# Patient Record
Sex: MALE | Employment: FULL TIME | URBAN - METROPOLITAN AREA
[De-identification: names, ages, dates, MRNs, and addresses within clinical notes are randomized per-mention and may not be internally consistent; named-entity substitution may affect disease eponyms.]

---

## 2018-09-13 ENCOUNTER — HOSPITAL ENCOUNTER (INPATIENT)
Facility: CLINIC | Age: 58
LOS: 2 days | Discharge: HOME | End: 2018-09-15
Attending: INTERNAL MEDICINE | Admitting: INTERNAL MEDICINE
Payer: COMMERCIAL

## 2018-09-13 ENCOUNTER — APPOINTMENT (OUTPATIENT)
Dept: ULTRASOUND IMAGING | Facility: CLINIC | Age: 58
End: 2018-09-13
Attending: STUDENT IN AN ORGANIZED HEALTH CARE EDUCATION/TRAINING PROGRAM
Payer: COMMERCIAL

## 2018-09-13 ENCOUNTER — APPOINTMENT (OUTPATIENT)
Dept: GENERAL RADIOLOGY | Facility: CLINIC | Age: 58
End: 2018-09-13
Attending: STUDENT IN AN ORGANIZED HEALTH CARE EDUCATION/TRAINING PROGRAM
Payer: COMMERCIAL

## 2018-09-13 DIAGNOSIS — K26.9 DUODENAL ULCER WITHOUT HEMORRHAGE OR PERFORATION AND WITHOUT OBSTRUCTION: ICD-10-CM

## 2018-09-13 DIAGNOSIS — R91.8 PULMONARY NODULES: ICD-10-CM

## 2018-09-13 DIAGNOSIS — I50.9 ACUTE ON CHRONIC HEART FAILURE, UNSPECIFIED HEART FAILURE TYPE (H): Primary | ICD-10-CM

## 2018-09-13 DIAGNOSIS — I10 BENIGN ESSENTIAL HYPERTENSION: ICD-10-CM

## 2018-09-13 DIAGNOSIS — I25.118 CORONARY ARTERY DISEASE INVOLVING NATIVE CORONARY ARTERY OF NATIVE HEART WITH OTHER FORM OF ANGINA PECTORIS (H): ICD-10-CM

## 2018-09-13 DIAGNOSIS — I21.4 NSTEMI (NON-ST ELEVATED MYOCARDIAL INFARCTION) (H): ICD-10-CM

## 2018-09-13 DIAGNOSIS — I20.0 UNSTABLE ANGINA (H): ICD-10-CM

## 2018-09-13 DIAGNOSIS — B18.2 CHRONIC HEPATITIS C WITHOUT HEPATIC COMA (H): ICD-10-CM

## 2018-09-13 DIAGNOSIS — F10.20 CHRONIC ALCOHOLISM (H): ICD-10-CM

## 2018-09-13 DIAGNOSIS — F17.218 CIGARETTE NICOTINE DEPENDENCE WITH OTHER NICOTINE-INDUCED DISORDER: ICD-10-CM

## 2018-09-13 LAB
ALBUMIN SERPL-MCNC: 3.6 G/DL (ref 3.4–5)
ALBUMIN UR-MCNC: NEGATIVE MG/DL
ALP SERPL-CCNC: 88 U/L (ref 40–150)
ALT SERPL W P-5'-P-CCNC: 40 U/L (ref 0–70)
ANION GAP SERPL CALCULATED.3IONS-SCNC: 12 MMOL/L (ref 3–14)
APPEARANCE UR: CLEAR
AST SERPL W P-5'-P-CCNC: 23 U/L (ref 0–45)
BASOPHILS # BLD AUTO: 0 10E9/L (ref 0–0.2)
BASOPHILS NFR BLD AUTO: 0.4 %
BILIRUB SERPL-MCNC: 0.6 MG/DL (ref 0.2–1.3)
BILIRUB UR QL STRIP: NEGATIVE
BUN SERPL-MCNC: 12 MG/DL (ref 7–30)
CALCIUM SERPL-MCNC: 9 MG/DL (ref 8.5–10.1)
CHLORIDE SERPL-SCNC: 103 MMOL/L (ref 94–109)
CO2 SERPL-SCNC: 24 MMOL/L (ref 20–32)
COLOR UR AUTO: YELLOW
CREAT SERPL-MCNC: 0.83 MG/DL (ref 0.66–1.25)
DIFFERENTIAL METHOD BLD: NORMAL
EOSINOPHIL # BLD AUTO: 0.2 10E9/L (ref 0–0.7)
EOSINOPHIL NFR BLD AUTO: 2 %
ERYTHROCYTE [DISTWIDTH] IN BLOOD BY AUTOMATED COUNT: 14 % (ref 10–15)
GFR SERPL CREATININE-BSD FRML MDRD: >90 ML/MIN/1.7M2
GLUCOSE SERPL-MCNC: 107 MG/DL (ref 70–99)
GLUCOSE UR STRIP-MCNC: NEGATIVE MG/DL
HBA1C MFR BLD: 6.2 % (ref 0–5.6)
HCT VFR BLD AUTO: 42.3 % (ref 40–53)
HGB BLD-MCNC: 13.7 G/DL (ref 13.3–17.7)
HGB UR QL STRIP: NEGATIVE
IMM GRANULOCYTES # BLD: 0 10E9/L (ref 0–0.4)
IMM GRANULOCYTES NFR BLD: 0.1 %
INR PPP: 1.07 (ref 0.86–1.14)
KETONES UR STRIP-MCNC: NEGATIVE MG/DL
LEUKOCYTE ESTERASE UR QL STRIP: NEGATIVE
LMWH PPP CHRO-ACNC: <0.1 IU/ML
LYMPHOCYTES # BLD AUTO: 3 10E9/L (ref 0.8–5.3)
LYMPHOCYTES NFR BLD AUTO: 34.8 %
MAGNESIUM SERPL-MCNC: 2.1 MG/DL (ref 1.6–2.3)
MCH RBC QN AUTO: 29.8 PG (ref 26.5–33)
MCHC RBC AUTO-ENTMCNC: 32.4 G/DL (ref 31.5–36.5)
MCV RBC AUTO: 92 FL (ref 78–100)
MONOCYTES # BLD AUTO: 0.8 10E9/L (ref 0–1.3)
MONOCYTES NFR BLD AUTO: 9.6 %
MUCOUS THREADS #/AREA URNS LPF: PRESENT /LPF
NEUTROPHILS # BLD AUTO: 4.6 10E9/L (ref 1.6–8.3)
NEUTROPHILS NFR BLD AUTO: 53.1 %
NITRATE UR QL: NEGATIVE
NRBC # BLD AUTO: 0 10*3/UL
NRBC BLD AUTO-RTO: 0 /100
PH UR STRIP: 5.5 PH (ref 5–7)
PLATELET # BLD AUTO: 381 10E9/L (ref 150–450)
PLATELET # BLD EST: NORMAL 10*3/UL
POTASSIUM SERPL-SCNC: 3.9 MMOL/L (ref 3.4–5.3)
PROT SERPL-MCNC: 8.4 G/DL (ref 6.8–8.8)
RBC # BLD AUTO: 4.59 10E12/L (ref 4.4–5.9)
RBC #/AREA URNS AUTO: <1 /HPF (ref 0–2)
SODIUM SERPL-SCNC: 139 MMOL/L (ref 133–144)
SOURCE: ABNORMAL
SP GR UR STRIP: 1.04 (ref 1–1.03)
TROPONIN I SERPL-MCNC: <0.015 UG/L (ref 0–0.04)
UROBILINOGEN UR STRIP-MCNC: NORMAL MG/DL (ref 0–2)
WBC # BLD AUTO: 8.6 10E9/L (ref 4–11)
WBC #/AREA URNS AUTO: <1 /HPF (ref 0–5)

## 2018-09-13 PROCEDURE — 25000128 H RX IP 250 OP 636: Performed by: STUDENT IN AN ORGANIZED HEALTH CARE EDUCATION/TRAINING PROGRAM

## 2018-09-13 PROCEDURE — 80053 COMPREHEN METABOLIC PANEL: CPT | Performed by: STUDENT IN AN ORGANIZED HEALTH CARE EDUCATION/TRAINING PROGRAM

## 2018-09-13 PROCEDURE — 93975 VASCULAR STUDY: CPT | Mod: TC

## 2018-09-13 PROCEDURE — 86803 HEPATITIS C AB TEST: CPT | Performed by: STUDENT IN AN ORGANIZED HEALTH CARE EDUCATION/TRAINING PROGRAM

## 2018-09-13 PROCEDURE — 85025 COMPLETE CBC W/AUTO DIFF WBC: CPT | Performed by: STUDENT IN AN ORGANIZED HEALTH CARE EDUCATION/TRAINING PROGRAM

## 2018-09-13 PROCEDURE — 40000141 ZZH STATISTIC PERIPHERAL IV START W/O US GUIDANCE

## 2018-09-13 PROCEDURE — 25000132 ZZH RX MED GY IP 250 OP 250 PS 637: Performed by: STUDENT IN AN ORGANIZED HEALTH CARE EDUCATION/TRAINING PROGRAM

## 2018-09-13 PROCEDURE — 93005 ELECTROCARDIOGRAM TRACING: CPT

## 2018-09-13 PROCEDURE — 80307 DRUG TEST PRSMV CHEM ANLYZR: CPT | Performed by: STUDENT IN AN ORGANIZED HEALTH CARE EDUCATION/TRAINING PROGRAM

## 2018-09-13 PROCEDURE — 87522 HEPATITIS C REVRS TRNSCRPJ: CPT | Performed by: STUDENT IN AN ORGANIZED HEALTH CARE EDUCATION/TRAINING PROGRAM

## 2018-09-13 PROCEDURE — 83735 ASSAY OF MAGNESIUM: CPT | Performed by: STUDENT IN AN ORGANIZED HEALTH CARE EDUCATION/TRAINING PROGRAM

## 2018-09-13 PROCEDURE — 84484 ASSAY OF TROPONIN QUANT: CPT | Performed by: STUDENT IN AN ORGANIZED HEALTH CARE EDUCATION/TRAINING PROGRAM

## 2018-09-13 PROCEDURE — 93010 ELECTROCARDIOGRAM REPORT: CPT | Mod: 76 | Performed by: INTERNAL MEDICINE

## 2018-09-13 PROCEDURE — 71045 X-RAY EXAM CHEST 1 VIEW: CPT

## 2018-09-13 PROCEDURE — 36415 COLL VENOUS BLD VENIPUNCTURE: CPT | Performed by: STUDENT IN AN ORGANIZED HEALTH CARE EDUCATION/TRAINING PROGRAM

## 2018-09-13 PROCEDURE — 87389 HIV-1 AG W/HIV-1&-2 AB AG IA: CPT | Performed by: STUDENT IN AN ORGANIZED HEALTH CARE EDUCATION/TRAINING PROGRAM

## 2018-09-13 PROCEDURE — 83036 HEMOGLOBIN GLYCOSYLATED A1C: CPT | Performed by: STUDENT IN AN ORGANIZED HEALTH CARE EDUCATION/TRAINING PROGRAM

## 2018-09-13 PROCEDURE — 85610 PROTHROMBIN TIME: CPT | Performed by: STUDENT IN AN ORGANIZED HEALTH CARE EDUCATION/TRAINING PROGRAM

## 2018-09-13 PROCEDURE — 85520 HEPARIN ASSAY: CPT | Performed by: STUDENT IN AN ORGANIZED HEALTH CARE EDUCATION/TRAINING PROGRAM

## 2018-09-13 PROCEDURE — 81001 URINALYSIS AUTO W/SCOPE: CPT | Performed by: STUDENT IN AN ORGANIZED HEALTH CARE EDUCATION/TRAINING PROGRAM

## 2018-09-13 PROCEDURE — 21400006 ZZH R&B CCU INTERMEDIATE UMMC

## 2018-09-13 RX ORDER — LIDOCAINE 40 MG/G
CREAM TOPICAL
Status: DISCONTINUED | OUTPATIENT
Start: 2018-09-13 | End: 2018-09-15 | Stop reason: HOSPADM

## 2018-09-13 RX ORDER — METOPROLOL SUCCINATE 25 MG/1
25 TABLET, EXTENDED RELEASE ORAL DAILY
Status: DISCONTINUED | OUTPATIENT
Start: 2018-09-13 | End: 2018-09-15 | Stop reason: HOSPADM

## 2018-09-13 RX ORDER — ASPIRIN 81 MG/1
81 TABLET, CHEWABLE ORAL DAILY
Status: DISCONTINUED | OUTPATIENT
Start: 2018-09-14 | End: 2018-09-14

## 2018-09-13 RX ORDER — MULTIPLE VITAMINS W/ MINERALS TAB 9MG-400MCG
1 TAB ORAL DAILY
Status: DISCONTINUED | OUTPATIENT
Start: 2018-09-13 | End: 2018-09-15 | Stop reason: HOSPADM

## 2018-09-13 RX ORDER — NITROGLYCERIN 0.4 MG/1
0.4 TABLET SUBLINGUAL EVERY 5 MIN PRN
Status: DISCONTINUED | OUTPATIENT
Start: 2018-09-13 | End: 2018-09-15 | Stop reason: HOSPADM

## 2018-09-13 RX ORDER — LANOLIN ALCOHOL/MO/W.PET/CERES
100 CREAM (GRAM) TOPICAL DAILY
Status: DISCONTINUED | OUTPATIENT
Start: 2018-09-13 | End: 2018-09-15 | Stop reason: HOSPADM

## 2018-09-13 RX ORDER — HEPARIN SODIUM 10000 [USP'U]/100ML
0-3500 INJECTION, SOLUTION INTRAVENOUS CONTINUOUS
Status: DISCONTINUED | OUTPATIENT
Start: 2018-09-13 | End: 2018-09-14

## 2018-09-13 RX ORDER — LISINOPRIL 5 MG/1
5 TABLET ORAL DAILY
Status: DISCONTINUED | OUTPATIENT
Start: 2018-09-13 | End: 2018-09-14

## 2018-09-13 RX ORDER — POLYETHYLENE GLYCOL 3350 17 G/17G
17 POWDER, FOR SOLUTION ORAL DAILY PRN
Status: DISCONTINUED | OUTPATIENT
Start: 2018-09-13 | End: 2018-09-15 | Stop reason: HOSPADM

## 2018-09-13 RX ORDER — NICOTINE 21 MG/24HR
1 PATCH, TRANSDERMAL 24 HOURS TRANSDERMAL DAILY
Status: DISCONTINUED | OUTPATIENT
Start: 2018-09-13 | End: 2018-09-15 | Stop reason: HOSPADM

## 2018-09-13 RX ORDER — NITROGLYCERIN 20 MG/100ML
0.07-2 INJECTION INTRAVENOUS CONTINUOUS
Status: DISCONTINUED | OUTPATIENT
Start: 2018-09-13 | End: 2018-09-13

## 2018-09-13 RX ORDER — ACETAMINOPHEN 650 MG/1
650 SUPPOSITORY RECTAL EVERY 4 HOURS PRN
Status: DISCONTINUED | OUTPATIENT
Start: 2018-09-13 | End: 2018-09-15 | Stop reason: HOSPADM

## 2018-09-13 RX ORDER — ACETAMINOPHEN 325 MG/1
650 TABLET ORAL EVERY 4 HOURS PRN
Status: DISCONTINUED | OUTPATIENT
Start: 2018-09-13 | End: 2018-09-15 | Stop reason: HOSPADM

## 2018-09-13 RX ORDER — FUROSEMIDE 10 MG/ML
40 INJECTION INTRAMUSCULAR; INTRAVENOUS ONCE
Status: COMPLETED | OUTPATIENT
Start: 2018-09-13 | End: 2018-09-13

## 2018-09-13 RX ORDER — NITROGLYCERIN 20 MG/100ML
0.07-2 INJECTION INTRAVENOUS CONTINUOUS
Status: DISCONTINUED | OUTPATIENT
Start: 2018-09-13 | End: 2018-09-14

## 2018-09-13 RX ORDER — BISACODYL 5 MG
15 TABLET, DELAYED RELEASE (ENTERIC COATED) ORAL DAILY PRN
Status: DISCONTINUED | OUTPATIENT
Start: 2018-09-13 | End: 2018-09-15 | Stop reason: HOSPADM

## 2018-09-13 RX ORDER — PANTOPRAZOLE SODIUM 40 MG/1
40 TABLET, DELAYED RELEASE ORAL
Status: DISCONTINUED | OUTPATIENT
Start: 2018-09-13 | End: 2018-09-15 | Stop reason: HOSPADM

## 2018-09-13 RX ORDER — ALUMINA, MAGNESIA, AND SIMETHICONE 2400; 2400; 240 MG/30ML; MG/30ML; MG/30ML
30 SUSPENSION ORAL EVERY 4 HOURS PRN
Status: DISCONTINUED | OUTPATIENT
Start: 2018-09-13 | End: 2018-09-15 | Stop reason: HOSPADM

## 2018-09-13 RX ORDER — LORAZEPAM 1 MG/1
1-2 TABLET ORAL EVERY 30 MIN PRN
Status: DISCONTINUED | OUTPATIENT
Start: 2018-09-13 | End: 2018-09-15 | Stop reason: HOSPADM

## 2018-09-13 RX ORDER — DOCUSATE SODIUM 100 MG/1
100 CAPSULE, LIQUID FILLED ORAL 2 TIMES DAILY
Status: DISCONTINUED | OUTPATIENT
Start: 2018-09-13 | End: 2018-09-15 | Stop reason: HOSPADM

## 2018-09-13 RX ORDER — FOLIC ACID 1 MG/1
1 TABLET ORAL DAILY
Status: DISCONTINUED | OUTPATIENT
Start: 2018-09-13 | End: 2018-09-15 | Stop reason: HOSPADM

## 2018-09-13 RX ORDER — ATORVASTATIN CALCIUM 80 MG/1
80 TABLET, FILM COATED ORAL EVERY EVENING
Status: DISCONTINUED | OUTPATIENT
Start: 2018-09-13 | End: 2018-09-15 | Stop reason: HOSPADM

## 2018-09-13 RX ORDER — BISACODYL 5 MG
10 TABLET, DELAYED RELEASE (ENTERIC COATED) ORAL DAILY PRN
Status: DISCONTINUED | OUTPATIENT
Start: 2018-09-13 | End: 2018-09-15 | Stop reason: HOSPADM

## 2018-09-13 RX ORDER — BISACODYL 5 MG
5 TABLET, DELAYED RELEASE (ENTERIC COATED) ORAL DAILY PRN
Status: DISCONTINUED | OUTPATIENT
Start: 2018-09-13 | End: 2018-09-15 | Stop reason: HOSPADM

## 2018-09-13 RX ADMIN — MULTIPLE VITAMINS W/ MINERALS TAB 1 TABLET: TAB at 18:33

## 2018-09-13 RX ADMIN — Medication 100 MG: at 18:33

## 2018-09-13 RX ADMIN — LISINOPRIL 5 MG: 5 TABLET ORAL at 18:42

## 2018-09-13 RX ADMIN — NITROGLYCERIN 0.07 MCG/KG/MIN: 20 INJECTION INTRAVENOUS at 21:11

## 2018-09-13 RX ADMIN — NITROGLYCERIN 0.4 MG: 0.4 TABLET, ORALLY DISINTEGRATING SUBLINGUAL at 20:08

## 2018-09-13 RX ADMIN — FOLIC ACID 1 MG: 1 TABLET ORAL at 18:33

## 2018-09-13 RX ADMIN — HEPARIN SODIUM 850 UNITS/HR: 10000 INJECTION, SOLUTION INTRAVENOUS at 21:32

## 2018-09-13 RX ADMIN — FUROSEMIDE 40 MG: 10 INJECTION, SOLUTION INTRAVENOUS at 18:33

## 2018-09-13 RX ADMIN — NITROGLYCERIN 0.4 MG: 0.4 TABLET, ORALLY DISINTEGRATING SUBLINGUAL at 20:13

## 2018-09-13 RX ADMIN — NITROGLYCERIN 0.4 MG: 0.4 TABLET, ORALLY DISINTEGRATING SUBLINGUAL at 20:03

## 2018-09-13 RX ADMIN — METOPROLOL SUCCINATE 25 MG: 25 TABLET, EXTENDED RELEASE ORAL at 18:33

## 2018-09-13 RX ADMIN — PANTOPRAZOLE SODIUM 40 MG: 40 TABLET, DELAYED RELEASE ORAL at 18:33

## 2018-09-13 ASSESSMENT — ACTIVITIES OF DAILY LIVING (ADL): ADLS_ACUITY_SCORE: 9

## 2018-09-13 NOTE — IP AVS SNAPSHOT
[address] [address]    Phone:  [phone]                                       After Visit Summary   9/13/2018    bAel Orellana    MRN: [id number]           After Visit Summary Signature Page     I have received my discharge instructions, and my questions have been answered. I have discussed any challenges I see with this plan with the nurse or doctor.    ..........................................................................................................................................  Patient/Patient Representative Signature      ..........................................................................................................................................  Patient Representative Print Name and Relationship to Patient    ..................................................               ................................................  Date                                   Time    ..........................................................................................................................................  Reviewed by Signature/Title    ...................................................              ..............................................  Date                                               Time          22EPIC Rev 08/18

## 2018-09-13 NOTE — IP AVS SNAPSHOT
MRN:[id number]                      After Visit Summary   9/13/2018    Abel Orellana    MRN: [id number]           Thank you!     Thank you for choosing [facility] for your care. Our goal is always to provide you with excellent care. Hearing back from our patients is one way we can continue to improve our services. Please take a few minutes to complete the written survey that you may receive in the mail after you visit with us. Thank you!        Patient Information     Date Of Birth          1960        Designated Caregiver       Most Recent Value    Caregiver    Will someone help with your care after discharge? yes    Name of designated caregiver Brandy Trammell    Phone number of caregiver [phone]    Caregiver address [facility]      About your hospital stay     You were admitted on:  September 13, 2018 You last received care in the:  [address] [facility]    You were discharged on:  September 15, 2018        Reason for your hospital stay       You were admitted due to worsening symptoms of heart failure.                  Who to Call     For medical emergencies, please call 911.  For non-urgent questions about your medical care, please call your primary care provider or clinic, [phone]          Attending Provider     Provider Specialty    David Burns MD Cardiology    BeaufortJerome MD Cardiology       Primary Care Provider Office Phone # Fax #    Luis Ardeleanu [phone]      After Care Instructions     Activity       Your activity upon discharge: activity as tolerated            Diet       Follow this diet upon discharge: Orders Placed This Encounter      Low Saturated Fat Na <2400 mg            Discharge Instructions       STOP use of cigarettes, alcohol and ilegal drugs in order to protect your heart.                  Follow-up Appointments     Adult [facility] Follow-up and recommended labs and tests       Follow up with primary care  "provider, within 7 days to evaluate medication change, to evaluate treatment change and for hospital follow- up.  The following labs/tests are recommended: BMP +Mg.      Appointments on [place] and/or [facility] (with [facility], [place] or [facility] provider or service). Call [phone] or  [phone]  if you haven't heard regarding these appointments within 7 days of discharge.                  Additional Services     CARDIOVASCULAR CORE CLINIC REFERRAL           Cardiac Rehab Referral       *This therapy referral will be filtered to a centralized scheduling office at [facility] and the patient will receive a call to schedule an appointment at a [facility] location most convenient for them.*     If you have not heard from the scheduling office within 2 business days, please call [phone] for all locations, with the exception of [place], please call [phone].    Please be aware that coverage of these services is subject to the terms and limitations of your health insurance plan.  Call member services at your health plan with any benefit or coverage questions.      **Note to Provider:  If you are referring outside of [facility] for the therapy appointment, please list the name of the location in the \"special instructions\" above, print the referral and give to the patient to schedule the appointment.                   Cardiology Eval Adult Referral       Preferred location:  [facility] [phone]   [email or web address]    Please be aware that coverage of these services is subject to the terms and limitations of your health insurance plan.  Call member services at your health plan with any benefit or coverage questions.      Please bring the following to your appointment:  Any x-rays, CTs or MRIs which have been performed. Contact the facility where they were done to arrange for  prior to your scheduled " "appointment.    List of current medications  This referral request   Any documents/labs given to you for this referral            GI Hepatology Adult IP Consult       Pt with liver disease in the setting of alcoholism and active HCV.            Lung Nodule Program Referral Location: [facility] - [phone]       Please be aware that coverage of these services is subject to the terms and limitations of your health insurance plan. Call member services at your health plan with any benefit or coverage questions.     For [facility] and [place] locations, you will be contacted within 1-2 business days to schedule your appointment, if you haven't heard from us please call the number above. For Other referrals, please call the location directly.                       Pending Results     Date and Time Order Name Status Description    9/13/2018 1755 Hepatitis C RNA quantitative In process     9/13/2018 1716 Comprehen Drug Analysis UR In process             Statement of Approval     Ordered          09/15/18 1400  I have reviewed and agree with all the recommendations and orders detailed in this document.  EFFECTIVE NOW     Approved and electronically signed by:  Jean-Claude Florence MD             Admission Information     Date & Time Provider Department Dept. Phone    9/13/2018 Jerome Daily MD [address] [facility] [phone]      Your Vitals Were     Blood Pressure Pulse Temperature Respirations Height Weight    100/72 (BP Location: Right arm) 83 98  F (36.7  C) (Oral) 16 1.676 m (5' 6\") 67.2 kg (148 lb 1.6 oz)    Pulse Oximetry BMI (Body Mass Index)                94% 23.9 kg/m2          MyChart Information     [employer] lets you send messages to your doctor, view your test results, renew your prescriptions, schedule appointments and more. To sign up, go to [email or web address] . Click on \"Log in\" on the left side of the screen, which will take you to the Welcome page. " "Then click on \"Sign up Now\" on the right side of the page.     You will be asked to enter the access code listed below, as well as some personal information. Please follow the directions to create your username and password.     Your access code is: [email or web address]  Expires: 2018  1:07 PM     Your access code will  in 90 days. If you need help or a new code, please call your [facility] clinic or [phone].        Care EveryWhere ID     This is your Care EveryWhere ID. This could be used by other organizations to access your [facility] medical records  [id number]        Equal Access to Services     [facility]: Marcus Cassidy, albina doty, mimi moore, josias laureano . So [facility] [phone].    ATENCIÓN: Si habla español, tiene a marie disposición servicios gratuitos de asistencia lingüística. Llame al [phone].    We comply with applicable federal civil rights laws and [place] laws. We do not discriminate on the basis of race, color, national origin, age, disability, sex, sexual orientation, or gender identity.               Review of your medicines      START taking        Dose / Directions    aspirin 81 MG EC tablet   Used for:  NSTEMI (non-ST elevated myocardial infarction) (H), Coronary artery disease involving native coronary artery of native heart with other form of angina pectoris (H)        Dose:  81 mg   Start taking on:  2018   Take 1 tablet (81 mg) by mouth daily   Quantity:  30 tablet   Refills:  3       atorvastatin 80 MG tablet   Commonly known as:  LIPITOR   Used for:  NSTEMI (non-ST elevated myocardial infarction) (H), Coronary artery disease involving native coronary artery of native heart with other form of angina pectoris (H)        Dose:  80 mg   Take 1 tablet (80 mg) by mouth every evening   Quantity:  30 tablet   Refills:  3       clopidogrel 75 MG tablet   Commonly known as:  PLAVIX   Used for:  NSTEMI (non-ST " elevated myocardial infarction) (H)        Dose:  75 mg   Start taking on:  9/16/2018   Take 1 tablet (75 mg) by mouth daily   Quantity:  30 tablet   Refills:  3       folic acid 1 MG tablet   Commonly known as:  FOLVITE   Used for:  Chronic alcoholism (H)        Dose:  1 mg   Start taking on:  9/16/2018   Take 1 tablet (1 mg) by mouth daily   Quantity:  30 tablet   Refills:  3       furosemide 20 MG tablet   Commonly known as:  LASIX        Dose:  20 mg   Take 1 tablet (20 mg) by mouth daily   Quantity:  30 tablet   Refills:  3       lisinopril 5 MG tablet   Commonly known as:  PRINIVIL/ZESTRIL   Used for:  NSTEMI (non-ST elevated myocardial infarction) (H), Benign essential hypertension        Dose:  5 mg   Start taking on:  9/16/2018   Take 1 tablet (5 mg) by mouth daily   Quantity:  30 tablet   Refills:  3       metoprolol succinate 25 MG 24 hr tablet   Commonly known as:  TOPROL-XL   Used for:  NSTEMI (non-ST elevated myocardial infarction) (H), Coronary artery disease involving native coronary artery of native heart with other form of angina pectoris (H)        Dose:  25 mg   Start taking on:  9/16/2018   Take 1 tablet (25 mg) by mouth daily   Quantity:  30 tablet   Refills:  3       multivitamin, therapeutic with minerals Tabs tablet   Used for:  Chronic alcoholism (H)        Dose:  1 tablet   Start taking on:  9/16/2018   Take 1 tablet by mouth daily   Quantity:  30 each   Refills:  3       nicotine 21 MG/24HR 24 hr patch   Commonly known as:  NICODERM CQ   Used for:  Cigarette nicotine dependence with other nicotine-induced disorder        Dose:  1 patch   Start taking on:  9/16/2018   Place 1 patch onto the skin daily   Quantity:  30 patch   Refills:  3       nitroGLYcerin 0.4 MG sublingual tablet   Commonly known as:  NITROSTAT   Used for:  Unstable angina (H)        For chest pain place 1 tablet under the tongue every 5 minutes for 3 doses. If symptoms persist 5 minutes after 1st dose call 911.    Quantity:  25 tablet   Refills:  3       pantoprazole 40 MG EC tablet   Commonly known as:  PROTONIX   Used for:  Duodenal ulcer without hemorrhage or perforation and without obstruction        Dose:  40 mg   Take 1 tablet (40 mg) by mouth 2 times daily (before meals)   Quantity:  30 tablet   Refills:  3       thiamine 100 MG tablet   Used for:  Chronic alcoholism (H)        Dose:  100 mg   Start taking on:  9/16/2018   Take 1 tablet (100 mg) by mouth daily   Quantity:  30 tablet   Refills:  3            Where to get your medicines      These medications were sent to [facility] - [place], [place] - [address]  [address] [address]     Phone:  [phone]     aspirin 81 MG EC tablet    atorvastatin 80 MG tablet    clopidogrel 75 MG tablet    folic acid 1 MG tablet    furosemide 20 MG tablet    lisinopril 5 MG tablet    metoprolol succinate 25 MG 24 hr tablet    multivitamin, therapeutic with minerals Tabs tablet    nicotine 21 MG/24HR 24 hr patch    nitroGLYcerin 0.4 MG sublingual tablet    pantoprazole 40 MG EC tablet    thiamine 100 MG tablet                Protect others around you: Learn how to safely use, store and throw away your medicines at www.disposemymeds.org.             Medication List: This is a list of all your medications and when to take them. Check marks below indicate your daily home schedule. Keep this list as a reference.      Medications           Morning Afternoon Evening Bedtime As Needed    aspirin 81 MG EC tablet   Take 1 tablet (81 mg) by mouth daily   Start taking on:  9/16/2018   Last time this was given:  81 mg on 9/15/2018  8:04 AM                                   atorvastatin 80 MG tablet   Commonly known as:  LIPITOR   Take 1 tablet (80 mg) by mouth every evening   Last time this was given:  80 mg on 9/14/2018  8:10 PM                                   clopidogrel 75 MG tablet   Commonly known as:  PLAVIX   Take 1 tablet (75 mg) by  mouth daily   Start taking on:  9/16/2018   Last time this was given:  225 mg on 9/15/2018 12:20 PM            Start tomorrow morning 9/16/18                       folic acid 1 MG tablet   Commonly known as:  FOLVITE   Take 1 tablet (1 mg) by mouth daily   Start taking on:  9/16/2018   Last time this was given:  1 mg on 9/15/2018  8:05 AM                                furosemide 20 MG tablet   Commonly known as:  LASIX   Take 1 tablet (20 mg) by mouth daily                                   lisinopril 5 MG tablet   Commonly known as:  PRINIVIL/ZESTRIL   Take 1 tablet (5 mg) by mouth daily   Start taking on:  9/16/2018   Last time this was given:  5 mg on 9/15/2018  8:04 AM                                   metoprolol succinate 25 MG 24 hr tablet   Commonly known as:  TOPROL-XL   Take 1 tablet (25 mg) by mouth daily   Start taking on:  9/16/2018   Last time this was given:  25 mg on 9/15/2018  8:04 AM                                   multivitamin, therapeutic with minerals Tabs tablet   Take 1 tablet by mouth daily   Start taking on:  9/16/2018   Last time this was given:  1 tablet on 9/15/2018  8:03 AM                                   nicotine 21 MG/24HR 24 hr patch   Commonly known as:  NICODERM CQ   Place 1 patch onto the skin daily   Start taking on:  9/16/2018   Last time this was given:  1 patch on 9/15/2018  7:58 AM                                   nitroGLYcerin 0.4 MG sublingual tablet   Commonly known as:  NITROSTAT   For chest pain place 1 tablet under the tongue every 5 minutes for 3 doses. If symptoms persist 5 minutes after 1st dose call 911.   Last time this was given:  0.4 mg on 9/13/2018  8:13 PM                                   pantoprazole 40 MG EC tablet   Commonly known as:  PROTONIX   Take 1 tablet (40 mg) by mouth 2 times daily (before meals)   Last time this was given:  40 mg on 9/15/2018  8:04 AM                                      thiamine 100 MG tablet   Take 1 tablet (100 mg) by mouth  daily   Start taking on:  9/16/2018   Last time this was given:  100 mg on 9/15/2018  8:05 AM                                             More Information        Discharge Instructions for Heart Attack  You have had a heart attack (acute myocardial infarction). A heart attack occurs when a vessel that sends blood to your heart suddenly becomes blocked. This causes your heart not to work as well as it should. Follow these guidelines for home care and lifestyle changes.  Home care    Take your medicines exactly as directed. Don?t skip doses. Talk with your healthcare provider if your medicines aren't working for you. Together you can come up with another treatment plan.    Remember that recovery after a heart attack takes time. Plan to rest for at least 4 to 8 weeks while you recover. Then return to normal activity when your doctor says it?s OK.    Ask your doctor about joining a heart rehabilitation program. This can help strengthen your heart and lungs and give you more energy and confidence.    Tell your doctor if you are feeling depressed. Feelings of sadness are common after a heart attack. But it is important to speak to someone or seek counseling if you are feeling overwhelmed by these feelings.    Call 911 right away if you have chest pain or pain that goes to your shoulder, neck, or back. Don't drive yourself to the hospital.    Ask your family members to learn CPR. This is an important skill that can save lives when it's needed.    Learn to take your own blood pressure and pulse. Keep a record of your results. Ask your doctor when you should seek emergency medical attention. He or she will tell you which blood pressure reading is dangerous.  Lifestyle changes  Your heart attack might have been caused by cardiovascular disease. Your healthcare provider will work with you to make changes to your lifestyle. This will help the heart disease from getting worse. These changes will most likely be a combination of  diet and exercise.  Diet  Your healthcare provider will tell you what changes you need to make to your diet. You may need to see a registered dietitian for help with these diet changes. These changes may include:    Cutting back on how much fat and cholesterol you eat    Cutting back on how much salt (sodium) you eat, especially if you have high blood pressure    Eating more fresh vegetables and fruits    Eating lean proteins such as fish, poultry, beans, and peas, and eating less red meat and processed meats    Using low-fat dairy products    Using vegetable and nut oils in limited amounts    Limiting how many sweets and processed foods such as chips, cookies, and baked goods you eat    Limiting how often you eat out. And when you do eat out, making better food choices.    Not eating fried or greasy foods, or foods high in saturated fat  Exercise  Your healthcare provider may tell you to get more exercise if you haven't been physically active. Depending on your case, your provider may recommend that you get moderate to vigorous physical activity for at least 40 minutes each day, and for at least 3 to 4 days each week. A few examples of moderate to vigorous activity include:    Walking at a brisk pace, about 3 to 4 miles per hour    Jogging or running    Swimming or water aerobics    Hiking    Dancing    Martial arts    Tennis    Riding a bicycle or stationary bike  Other changes  Your healthcare provider may also recommend that you:    Lose weight. If you are overweight or obese, your provider will work with you to lose extra pounds. Making diet changes and getting more exercise can help. A good goal is to lose your 10% of your body weight in one year.    Stop smoking. Sign up for a stop-smoking program to make it more likely for you to quit for good. You can join a stop-smoking support group. Or ask your doctor about nicotine replacement products.    Learn to manage stress. Stress management techniques to help you  deal with stress in your home and work life. This will help you feel better emotionally and ease the strain on your heart.  Follow-up  Make a follow-up appointment as directed.     Call 911  Call 911 right away if you have:    Chest pain that goes to your neck, jaw, back, or shoulder    Shortness of breath  When to call your healthcare provider  Call your healthcare provider right away if you have:    Lightheadedness, dizziness, or fainting    Feeling of irregular heartbeat or fast pulse   Date Last Reviewed: 10/1/2016    3220-3377 [employer]. [address] [place] [address]. All rights reserved. This information is not intended as a substitute for professional medical care. Always follow your healthcare professional's instructions.                Left-Sided Congestive Heart Failure (CHF)    The heart is a large muscle that pumps blood throughout the body. Blood carries oxygen to all the organs (including the brain), muscles, and skin of your body. After the body takes the oxygen out of the blood, the blood returns to the heart. The right side of the heart collects that blood and pumps it to the lungs to receive fresh oxygen. This oxygen-rich blood from the lungs then returns to the left side of the heart, where it is pumped back out to the brain and rest of the body, starting the process all over.   Heart failure occurs when the heart muscle is weakened. This can occur after a heart attack or with significant coronary artery disease. This affects the pumping action of the heart.   When the left side of the heart is weakened, it can?t handle the blood it is getting from the lungs. Pressure then builds up in the veins of the lungs, causing fluid to leak into the lung tissues. This may be referred to as congestive heart failure. This causes you to feel short of breath, weak, or dizzy. These symptoms are often worse with exertion, such as climbing stairs or walking up hills. Lying flat is  uncomfortable and can make your breathing worse. This may make sleeping difficult and force you to use extra pillows to elevate your upper body to help you sleep well.  Causes of heart failure include:    Coronary artery disease    Heart attack in the past (also known as acute myocardial infarction, or AMI)    High blood pressure    Damaged heart valve    Diabetes    Obesity    Cigarette smoking    Alcohol abuse  Treatment  Heart failure is a chronic condition. There is no cure. The purpose of medical treatment is to improve the pumping action of the heart, and remove excess water and fluids from the body. A number of medicines can help reach this goal, improve symptoms and keep the heart from becoming weaker. In some cases of severe heart failure, a mechanical device will be placed in the heart to help the heart pump. Another major goal is to better treat the causes of heart failure, such as diabetes, high blood pressure, and your lifestyle.  Home care    Check your weight every day. A sudden increase in weight gain could mean worsening heart failure.  ? Use the same scale every day.  ? Weigh yourself at the same time every day.  ? Make sure the scale is on the floor, not on a rug.  ? Keep a record of your weight every day, so your healthcare provider can see it. If you are not given a log sheet for this, keep a separate journal for this purpose.     Cut back on how much salt (sodium) you eat:  ? Your provider will tell you how much salt to have daily, usually 2,000 mg or less.  ? Avoid high-salt foods. These include olives, pickles, smoked meats, processed foods, and salted potato chips.  ? Don't add salt to your food at the table. Use only small amounts of salt when cooking.  ? Avoid binging on salt-heavy meals.    Follow your healthcare provider's recommendations about how much fluid you should have.    Stop smoking.    Cut back on the amount of alcohol you drink.    Lose weight if you are overweight. The excess  weight adds a lot of stress on the workload of the heart.    Stay active. Talk to your provider about an exercise program that is safe for your heart.    Keep your feet elevated to reduce swelling. Ask your provider about support hose as a preventive treatment for daytime leg swelling.    Follow your healthcare provider's instructions closely.  Besides taking your medicine as instructed, an important part of treatment includes lifestyle changes. These include diet, physical activity, stopping smoking, and weight control.  Improve your diet. Often in the hospital, people are given a heart healthy diet. This includes more fresh foods, lower saturated fat, less processed foods, and lower salt.  Follow-up care  Follow up with your healthcare provider, or as advised. Make sure to keep any appointments that were made for you. This can help better control heart failure.  If an X-ray was done, you will be told of any new findings that may affect your care.  Call 911  Call 911 if you:    Become severely short of breath    Feel lightheaded, or feel like you might pass out or faint    Have chest pain or discomfort that is different than usual, the medicines your provider told you to use for this don't help, or the pain lasts longer than 10 to 15 minutes    Develop a rapid heart rate suddenly  When to seek medical advice  Call your healthcare provider right away if you have any of these signs of worsening heart failure:    Sudden weight gain. This means more than 2 pounds in 1 day, or 5 pounds in 1 week, or whatever weight gain you were told to report by your provider    Trouble breathing not related to being active    New or increased swelling of your legs or ankles    Swelling or pain in your abdomen    Breathing trouble at night, waking up short of breath or needing more pillows to elevate your upper body to help you breathe    Frequent coughing that doesn?t go away    Feeling much more tired than usual  Date Last Reviewed:  1/4/2016 2000-2017 [employer]. [address] [place] [address], [place] [address]. All rights reserved. This information is not intended as a substitute for professional medical care. Always follow your healthcare professional's instructions.                Discharge Instructions for Heart Failure  The heart is a muscle that pumps oxygen-rich blood to all parts of the body. When you have heart failure, the heart is not able to pump as well as it should. Blood and fluid may back up into the lungs (congestive heart failure), and some parts of the body don?t get enough oxygen-rich blood to work normally. These problems lead to the symptoms of heart failure. Heart failure can occur due to an injury to the heart or from natural processes.  You can control symptoms of heart failure with some lifestyle changes and by following your doctor's advice.  Activity  Ask your healthcare provider about an exercise program. You can benefit from simple activities such as walking or gardening. Exercising most days of the week can make you feel better. Don't be discouraged if your progress is slow at first. Rest as needed. Stop activity if you develop symptoms such as chest pain, lightheadedness, or significant shortness of breath. Find activities that you enjoy, such as brisk walking, dancing, swimming, or gardening. These will help you stay active and strengthen your heart.  Diet  Follow a heart healthy diet. And make sure to limit the salt (sodium) in your diet. Salt causes your body to hold water. This makes your heart work harder as there is more fluid for the heart to pump. Limit your salt by doing the following:    Limit canned, dried, packaged, and fast foods.    Don't add salt to your food.    Season foods with herbs instead of salt.    Watch how much liquids you drink. Drinking too much can make heart failure worse. Talk with your health care provider about how much you should drink each day.    Limit the amount of  alcohol you drink. It may harm your heart. Women should have no more than 1 drink a day and men should have no more than 2.    When you eat out, request that your meals have no added salt.  Tobacco  If you smoke, it's very important to quit. Smoking increases your chances of having a heart attack by harming the blood vessels that provide oxygen to your heart. This makes heart failure worse. Quitting smoking is the number one thing you can do to improve your health. Enroll in a stop-smoking program to improve your chances of success. Talk with your healthcare provider about medicines or nicotine replacement therapy to help you quit smoking. Ask your healthcare provider about smoking cessation support groups.  Medicine  Take your medicines exactly as prescribed. Learn the names and purpose of each of your medicines. Keep an accurate medicine list and current dosages with you at all times. Don't skip doses. If you miss a dose of your medicine, take it as soon as you remember. If you miss a dose and it's almost time for your next dose, just wait and take your next dose at the normal time. Don't take a double dose. If you are unsure, call your doctor's office. Make sure not to mix up your medicines or forget what you've taken the same day.  Weight monitoring  Weigh yourself every day. A sudden weight gain can mean your heart failure is getting worse. Weigh yourself at the same time of day and in the same kind of clothes. Ideally, weigh yourself first thing in the morning after you empty your bladder, but before you eat breakfast. Your healthcare provider will show you how to track your weight. He or she will also discuss with you when you should call if you have a sudden, unexpected increase in your weight.  In general, your healthcare provider may ask you to report if your weight goes up by more than 2 pounds in 1 day,  5 pounds in 1 week, or whatever weight gain you were told by your doctor. This is a sign that you are  retaining more fluid than you should be. Clues to weight gain include checking your ankles for swelling, or noticing you are short of breath when you lie down.  Follow-up care  Make a follow-up appointment as directed. Depending on the type and severity of heart failure you have, you may need follow-up as early as 7 days from hospital discharge. Keep appointments for checkups and lab tests that are needed to check your medicines and condition.  Recognize that your health and even survival depend on your following medical recommendations.  Symptoms  Heart failure can cause a variety of symptoms, including:    Shortness of breath    Trouble breathing at night, especially when you lie down    Swelling in the legs and feet or in the belly (abdomen)    Becoming easily fatigued    Irregular or rapid heartbeat    Weakness or lightheadedness    Swelling of the neck veins  It is important to know what to do if symptoms get worse or if you develop signs of worsening heart failure.     When to call your healthcare provider  Call your healthcare provider right away if you have any of these signs of worsening heart failure:    Sudden weight gain (more than 2 pounds in 1 day or 5 pounds in 1 week, or whatever weight gain you were told to report by your doctor)    Trouble breathing not related to being active    New or increased swelling of your legs or ankles    Swelling or pain in your abdomen    Breathing trouble at night (waking up short of breath, needing more pillows to breathe)    Frequent coughing that doesn't go away    Feeling much more tired than usual  Call 911  Call 911 right away if you have:    Severe shortness of breath, such that you can't catch your breath even while resting    Severe chest pain that does not resolve with rest or nitroglycerin    Pink, foamy mucus with cough and shortness of breath    A continuous rapid or irregular heartbeat    Passing out or fainting    Stroke symptoms such as sudden numbness  or weakness on one side of your face, arm, or leg or sudden confusion, trouble speaking or vision changes   Date Last Reviewed: 3/21/2016    8463-2680 [employer] [address] [place] [address], [place] [address]. All rights reserved. This information is not intended as a substitute for professional medical care. Always follow your healthcare professional's instructions.

## 2018-09-13 NOTE — H&P
Cardiology History and Physical  Abel Orellana MRN: [id number]  Age: 57 year old, : 1960  Primary care provider: No primary care provider on file.           Chief Complaint:     Shortness of breath and chest pain          History of Present Illness:     58 y/o male with PMHx significant for non-obstructive CAD, NICM LVEF 30% in 2014 (NYHA IIIa Stage C), HTN, HLD, past substance abuse with meth and heroin, chronic alcoholism, chronic HCV and duodenal ulcers was admitted due to worsening shortness of breath on exertion and chest pain.     Patient states that since two weeks ago he suddenly had dyspnea on exertion. Formerly was able to run on the treadmill for 30mins or more without any symptoms. Now he is unable to walk more than a block or go up a flight of stairs without dyspnea. He feels lightheaded with minimal activity, but denies any syncope. At times, he has blurry vision when being very short of breath. One week ago, he felt chest pain on the left side that radiated to the back which occurred on activity. It is described as a pressure-like sensation. Throughout the week chest pain now occurs at rest. It can last more than 30 minutes. He has not taken medications for this and unable to  Identify what makes the chest pain better. Occasionally feels palpitations. He also has PND and orthopnea.     He otherwise denies fever, chills nights sweats, headaches, sore throat, rhinorrhea, abdominal pain, nausea, vomiting, diarrhea, constipation, hematochezia, melena, dysuria, hematuria, joint pain, joint swelling or new rash.    Patient denies recent drug use. He does drink 5 shots of vodka, whiskey or rum per week. Denies hx of STD. Patient smokes about 1/2 - 1 pack of cigs per day. Denies family history of heart disease.    He went to ED at [facility] and had normal vital signs. Labs there were significant for elevated D-dimer. He had CT PE that was negative, but did show cardiomegaly with diffuse  pulmonary edema. Patient was therefore transferred to our Cardiology service for further work up.           Past Medical History:     CHF (congestive heart failure) (H)       Coronary artery disease       Kidney stone       Hypertension       Hepatitis C       Drug abuse                 Past Surgical History:      REP RUP MUSCULOTENDINUS CUFF OPN;AC     right shoulder   EXC LES TEND TEND SHEATH/CAP; FOOT 3/12/07   rt               Social History:     Current alcohol use, 5 drinks per week  Past Meth and Heroin use  Smokes 1/2 - 1 pack of cigs per day          Family History:     No cardiac family history          Allergies:     All allergies reviewed and addressed           Medications:     Current Facility-Administered Medications   Medication     acetaminophen (TYLENOL) Suppository 650 mg     acetaminophen (TYLENOL) tablet 650 mg     alum & mag hydroxide-simethicone (MYLANTA ES/MAALOX  ES) suspension 30 mL     [START ON 9/14/2018] aspirin chewable tablet 81 mg     bisacodyl (DULCOLAX) EC tablet 5 mg    Or     bisacodyl (DULCOLAX) EC tablet 10 mg    Or     bisacodyl (DULCOLAX) EC tablet 15 mg     docusate sodium (COLACE) capsule 100 mg     folic acid (FOLVITE) tablet 1 mg     furosemide (LASIX) injection 40 mg     lidocaine (LMX4) cream     lidocaine 1 % 1 mL     lisinopril (PRINIVIL/ZESTRIL) tablet 5 mg     LORazepam (ATIVAN) tablet 1-2 mg     medication instruction     metoprolol succinate (TOPROL-XL) 24 hr tablet 25 mg     multivitamin, therapeutic with minerals (THERA-VIT-M) tablet 1 tablet     nicotine (NICODERM CQ) 21 MG/24HR 24 hr patch 1 patch     nicotine Patch in Place     [START ON 9/14/2018] nicotine patch REMOVAL     nitroGLYcerin (NITROSTAT) sublingual tablet 0.4 mg     pantoprazole (PROTONIX) EC tablet 40 mg     polyethylene glycol (MIRALAX/GLYCOLAX) Packet 17 g     sodium chloride (PF) 0.9% PF flush 3 mL     sodium chloride (PF) 0.9% PF flush 3 mL     thiamine tablet 100 mg                     Physical Exam:     Temp: 97.5  F (36.4  C) Temp src: Oral BP: 101/75   Heart Rate: 90 Resp: 16 SpO2: 99 % O2 Device: Nasal cannula Oxygen Delivery: 2 LPM    Wt Readings from Last 4 Encounters:   No data found for Wt     No intake or output data in the 24 hours ending 09/13/18 1450    Gen: AA&Ox3, NAD  HEENT: PERRLA, EOMI, anicteric sclera, no oral ulcers  BACK: no CVA tenderness, no midline bony tenderness  PULM/THORAX: Low air sounds, mild crackles bilaterally  CV: RRR, S1 and S2 appreciated, no extra heart sounds, murmurs or rub auscultated, JVP ~ 15cm from clavicle  ABD: soft, nontender, nondistended, +bs, no HSM appreciated.  EXT: No edema, clubbing or cyanosis. No asymmetrical edema or tenderness to palpation in calves bilaterally.  NEURO: CN II-XII intact, strength 5/5 throughout    Lines  1PIV    Drips  None          Data:     Labs Reviewed on Admission  Pertinent for:  Pending labs          Most Recent Imaging:     Stress Test: 05/2015  ECG Stress Conclusions        The patient exercised for 12 minutes 30 seconds on the         Modified Jaiden protocol.         Normal functional capacity for age.        Patient began test with 2/10 chest discomfort which did not         change at all throughout test.        Negative stress ECG for ST segment depression.       Myocardial Perfusion Conclusions        1.  No ischemia or infarct noted.        2. Dilated left ventricle with severe global hypokinesis 25%         consistent with nonischemic cardiomyopathy.        3. Fixed inferior wall perfusion defect consistent with inferior         wall attenuation.     Myocardial perfusion scan: 03/2003  IMPRESSION: Moderate sized irreversible   myocardial perfusion  defect inferolateral apex to inferior wall. No   reversiblemyocardial perfusion defect.    Echo: 08/2014  Left Ventricle:     Mild LV dilatation. Global hypokinesis                                without regional wall motion abnormalities, EF                                 estimated 30%.      Right Ventricle:    Normal RV size and function.     Left Atrium:        Mild LA dilatation.     Right Atrium:       Normal RA size.     Aortic Valve:       Normal in appearance and function.     Mitral Valve:       Normal in appearance and function. Trace                         mitral regurgitation.     Tricuspid Valve:    Normal in appearance and function. Mild                         tricuspid regurgitation.     Pulmonic Valve:    Aorta:              Aorta is normal in dimension proximally.     Pericardium:        Normal pericardium.     IVC:                Normal IVC.     IAS:                Interatrial septum appears intact.       Cath: 03/2008  CORONARY ANGIOGRAPHY:   1. LEFT MAIN TRUNK: The left main is a large vessel with minimal luminal irregularities.     2. LEFT ANTERIOR DESCENDING: The LAD is a large type III vessel. The proximal to mid LAD has mild eccentric disease not exceeding 10 to 20% stenoses. A large diagonal one is seen without significant angiographic disease.     3. LEFT CIRCUMFLEX: The circumflex is a large vessel with minimal disease in the proximal segment. Two large obtuse marginal branches are seen without angiographic disease. The distal circumflex is a small vessel in the AV groove.     4. RIGHT CORONARY ARTERY: The RCA is a large dominant vessel with mild disease in the proximal segment. The mid RCA has one focal eccentric 20 to 25% stenosis. The remainder of the mid to distal RCA has very mild, diffuse disease. The GERMÁN and PDA do not display significant angiographic disease.     LEFT VENTRICULAR ANGIOGRAPHY: Ejection fraction mildly reduced at 40 to 45%. Mild left ventricular dilation with global hypokinesis. Left ventricular end-diastolic pressure was found to be normal at 5 mmHg.          Assessment and Plan:     58 y/o male with PMHx significant for non-obstructive CAD, NICM LVEF 30% in 2014 (NYHA IIIa Stage C), HTN, HLD, past substance abuse with meth  and heroin, chronic alcoholism, chronic HCV and duodenal ulcers was admitted due to worsening shortness of breath on exertion and chest pain.     #Acute on chronic heart failure  #NICM LVEF 30% (NYHA IIIa, Stage C)  #Shortness of breath  #Pulmonary edema  Patient has known non ischemic cardiomyopathy. Last echo on file in 2014 revealed LVEF of 30%. Patient does not have ICD. He is on goal directed therapy with metoprolol and lisinopril. He reports not using drugs for few years, but drinks 5 shots per week. He has PND and orthopnea. CXR with cardiomegaly and pulmonary edema. CT scan also showed pulmonary edema, but was negative for PE. Physical exam with elevated JVP. Current symptoms could be caused by worsening of NICM due to past alcoholism and meth use; but can't rule out ischemic causes as she has risk factors for CAD.   - Echocardiogram in the AM  - Lasix 40mg IV one time dose for tonight  - Strict I/Os  - Daily weight  - Low salt diet  - Might need coronary angiogram    #Non-obstructive CAD  #Unstable angina  Patient had angiogram in 2008 that showed 10-20% occlusion of LAD and 20-25% in mid RCA. Patient does have risk factors such as HLD, chronic and active smoker. He has been developing left sided chest pain that radiates to the back on exertion and now at rest. EKG without signs of ACS. Troponin at OSH was negative and repeat here as well. Low suspicion this is aortic dissection as patient with equal pulses on exam and not hypertensive. He had CT of aorta in 01/2018 due to chest pain that was negative for dissection.   - Aspirin 325 given  - Asprin 81 in the AM  - Starting heparin drip for unstable angina  - Echocardiogram in the AM  - Might need coronary angiogram  - Continue home statin    #HTN  - On lisinopril 5mg    #HLD  - Continue home statin    #Chronic smoker  Patient might also have underlying COPD. No PFTs on chart.   - Will need outpatient PFTs  - Nicoderm patch available  - If continues with  shortness of breath despite HF management, will start nebs    #Pulmonary nodule  #Precarinal node  CT scan from OSH revealed 1.3cm spiculated nodule within the lingula. Previous CT in January 2018 showed nodule at the lingula measuring 0.6cm.  Precarinal node detected in January 2018. Unclear if these represent inflammation, but due to doubling in size of lingular node in the setting of active chronic smoking patient will need PET CT.   - PET CT as outpatient    #Chronic active HCV  #Fatty liver disease  #Chronic alcohol use  Patient has HCV with elevated titers.  He also has chronic alcohol use and liver changes on biopsy from OSH that demonstrated fatty liver disease back in 2001. LFTs normal on admission as well as INR. Patient has high risk for cirrhosis and HCC.   - HCV with reflex titers  - Abdominal US with duplex  - Urine tox screen   - Will benefit from HCV treatment   - CIWA protocol  - Multivitamins    #Duodenal ulcer  EGD on 01/2018 that showed multiple bleeding ulcers in duodenum. No H pylori.  - Continue home PPI regimen    #Past methatmpheatmine use  #Past heroine use  - Urine tox    FEN: cardiac diet, NPO @ 0000  PPX: DVT Heparin drip, GI PPI  Code: FULL  Dispo: pending work up of heart failure     Patient to be discussed in AM.    Jean-Claude Florence MD PhD  PGY-3 Internal Medicine  Cardiology Service  Pager: [phone]

## 2018-09-14 ENCOUNTER — APPOINTMENT (OUTPATIENT)
Dept: CARDIOLOGY | Facility: CLINIC | Age: 58
End: 2018-09-14
Attending: INTERNAL MEDICINE
Payer: COMMERCIAL

## 2018-09-14 ENCOUNTER — APPOINTMENT (OUTPATIENT)
Dept: CARDIOLOGY | Facility: CLINIC | Age: 58
End: 2018-09-14
Attending: STUDENT IN AN ORGANIZED HEALTH CARE EDUCATION/TRAINING PROGRAM
Payer: COMMERCIAL

## 2018-09-14 LAB
ANION GAP SERPL CALCULATED.3IONS-SCNC: 8 MMOL/L (ref 3–14)
BUN SERPL-MCNC: 15 MG/DL (ref 7–30)
CALCIUM SERPL-MCNC: 9.1 MG/DL (ref 8.5–10.1)
CHLORIDE SERPL-SCNC: 100 MMOL/L (ref 94–109)
CO2 SERPL-SCNC: 26 MMOL/L (ref 20–32)
CREAT SERPL-MCNC: 0.86 MG/DL (ref 0.66–1.25)
ERYTHROCYTE [DISTWIDTH] IN BLOOD BY AUTOMATED COUNT: 13.9 % (ref 10–15)
GFR SERPL CREATININE-BSD FRML MDRD: >90 ML/MIN/1.7M2
GLUCOSE SERPL-MCNC: 117 MG/DL (ref 70–99)
HCT VFR BLD AUTO: 41.4 % (ref 40–53)
HGB BLD-MCNC: 13.5 G/DL (ref 13.3–17.7)
HIV 1+2 AB+HIV1 P24 AG SERPL QL IA: NONREACTIVE
INTERPRETATION ECG - MUSE: NORMAL
LMWH PPP CHRO-ACNC: 0.1 IU/ML
LMWH PPP CHRO-ACNC: 0.14 IU/ML
LMWH PPP CHRO-ACNC: 0.49 IU/ML
LMWH PPP CHRO-ACNC: <0.1 IU/ML
MCH RBC QN AUTO: 29.6 PG (ref 26.5–33)
MCHC RBC AUTO-ENTMCNC: 32.6 G/DL (ref 31.5–36.5)
MCV RBC AUTO: 91 FL (ref 78–100)
PLATELET # BLD AUTO: 387 10E9/L (ref 150–450)
POTASSIUM SERPL-SCNC: 3.9 MMOL/L (ref 3.4–5.3)
RBC # BLD AUTO: 4.56 10E12/L (ref 4.4–5.9)
SODIUM SERPL-SCNC: 134 MMOL/L (ref 133–144)
TROPONIN I SERPL-MCNC: 0.53 UG/L (ref 0–0.04)
WBC # BLD AUTO: 10.9 10E9/L (ref 4–11)

## 2018-09-14 PROCEDURE — 36415 COLL VENOUS BLD VENIPUNCTURE: CPT | Performed by: INTERNAL MEDICINE

## 2018-09-14 PROCEDURE — 25000128 H RX IP 250 OP 636: Performed by: STUDENT IN AN ORGANIZED HEALTH CARE EDUCATION/TRAINING PROGRAM

## 2018-09-14 PROCEDURE — 85347 COAGULATION TIME ACTIVATED: CPT

## 2018-09-14 PROCEDURE — B2111ZZ FLUOROSCOPY OF MULTIPLE CORONARY ARTERIES USING LOW OSMOLAR CONTRAST: ICD-10-PCS | Performed by: INTERNAL MEDICINE

## 2018-09-14 PROCEDURE — 85520 HEPARIN ASSAY: CPT | Performed by: INTERNAL MEDICINE

## 2018-09-14 PROCEDURE — 25000125 ZZHC RX 250: Performed by: STUDENT IN AN ORGANIZED HEALTH CARE EDUCATION/TRAINING PROGRAM

## 2018-09-14 PROCEDURE — 36415 COLL VENOUS BLD VENIPUNCTURE: CPT | Performed by: STUDENT IN AN ORGANIZED HEALTH CARE EDUCATION/TRAINING PROGRAM

## 2018-09-14 PROCEDURE — 93572 IV DOP VEL&/PRESS C FLO EA: CPT | Mod: 26 | Performed by: INTERNAL MEDICINE

## 2018-09-14 PROCEDURE — 40000264 ECHO COMPLETE WITH OPTISON

## 2018-09-14 PROCEDURE — 93571 IV DOP VEL&/PRESS C FLO 1ST: CPT

## 2018-09-14 PROCEDURE — 93306 TTE W/DOPPLER COMPLETE: CPT | Mod: 26 | Performed by: INTERNAL MEDICINE

## 2018-09-14 PROCEDURE — 85027 COMPLETE CBC AUTOMATED: CPT | Performed by: STUDENT IN AN ORGANIZED HEALTH CARE EDUCATION/TRAINING PROGRAM

## 2018-09-14 PROCEDURE — 27211089 ZZH KIT ACIST INJECTOR CR3

## 2018-09-14 PROCEDURE — 25500064 ZZH RX 255 OP 636: Performed by: INTERNAL MEDICINE

## 2018-09-14 PROCEDURE — 84484 ASSAY OF TROPONIN QUANT: CPT | Performed by: INTERNAL MEDICINE

## 2018-09-14 PROCEDURE — C1894 INTRO/SHEATH, NON-LASER: HCPCS

## 2018-09-14 PROCEDURE — 93005 ELECTROCARDIOGRAM TRACING: CPT

## 2018-09-14 PROCEDURE — 93571 IV DOP VEL&/PRESS C FLO 1ST: CPT | Mod: 26 | Performed by: INTERNAL MEDICINE

## 2018-09-14 PROCEDURE — 27210742 ZZH CATH CR1

## 2018-09-14 PROCEDURE — 27210787 ZZH MANIFOLD CR2

## 2018-09-14 PROCEDURE — 80048 BASIC METABOLIC PNL TOTAL CA: CPT | Performed by: STUDENT IN AN ORGANIZED HEALTH CARE EDUCATION/TRAINING PROGRAM

## 2018-09-14 PROCEDURE — C1769 GUIDE WIRE: HCPCS

## 2018-09-14 PROCEDURE — 93010 ELECTROCARDIOGRAM REPORT: CPT | Performed by: INTERNAL MEDICINE

## 2018-09-14 PROCEDURE — 25000128 H RX IP 250 OP 636: Performed by: INTERNAL MEDICINE

## 2018-09-14 PROCEDURE — 4A033BC MEASUREMENT OF ARTERIAL PRESSURE, CORONARY, PERCUTANEOUS APPROACH: ICD-10-PCS | Performed by: INTERNAL MEDICINE

## 2018-09-14 PROCEDURE — 85520 HEPARIN ASSAY: CPT | Performed by: STUDENT IN AN ORGANIZED HEALTH CARE EDUCATION/TRAINING PROGRAM

## 2018-09-14 PROCEDURE — 21400006 ZZH R&B CCU INTERMEDIATE UMMC

## 2018-09-14 PROCEDURE — 27210946 ZZH KIT HC TOTES DISP CR8

## 2018-09-14 PROCEDURE — 99152 MOD SED SAME PHYS/QHP 5/>YRS: CPT

## 2018-09-14 PROCEDURE — 93454 CORONARY ARTERY ANGIO S&I: CPT

## 2018-09-14 PROCEDURE — 93454 CORONARY ARTERY ANGIO S&I: CPT | Mod: 26 | Performed by: INTERNAL MEDICINE

## 2018-09-14 PROCEDURE — 99153 MOD SED SAME PHYS/QHP EA: CPT

## 2018-09-14 PROCEDURE — 25000132 ZZH RX MED GY IP 250 OP 250 PS 637: Performed by: INTERNAL MEDICINE

## 2018-09-14 PROCEDURE — 25000132 ZZH RX MED GY IP 250 OP 250 PS 637: Performed by: STUDENT IN AN ORGANIZED HEALTH CARE EDUCATION/TRAINING PROGRAM

## 2018-09-14 PROCEDURE — 93010 ELECTROCARDIOGRAM REPORT: CPT | Mod: 77 | Performed by: INTERNAL MEDICINE

## 2018-09-14 RX ORDER — FENTANYL CITRATE 50 UG/ML
25-50 INJECTION, SOLUTION INTRAMUSCULAR; INTRAVENOUS
Status: ACTIVE | OUTPATIENT
Start: 2018-09-14 | End: 2018-09-15

## 2018-09-14 RX ORDER — LORAZEPAM 2 MG/ML
.5-2 INJECTION INTRAMUSCULAR EVERY 4 HOURS PRN
Status: DISCONTINUED | OUTPATIENT
Start: 2018-09-14 | End: 2018-09-14 | Stop reason: HOSPADM

## 2018-09-14 RX ORDER — EPINEPHRINE 1 MG/ML
0.3 INJECTION, SOLUTION, CONCENTRATE INTRAVENOUS
Status: DISCONTINUED | OUTPATIENT
Start: 2018-09-14 | End: 2018-09-14 | Stop reason: HOSPADM

## 2018-09-14 RX ORDER — NITROGLYCERIN 20 MG/100ML
.07-2 INJECTION INTRAVENOUS CONTINUOUS PRN
Status: DISCONTINUED | OUTPATIENT
Start: 2018-09-14 | End: 2018-09-14 | Stop reason: HOSPADM

## 2018-09-14 RX ORDER — DIPHENHYDRAMINE HYDROCHLORIDE 50 MG/ML
25-50 INJECTION INTRAMUSCULAR; INTRAVENOUS
Status: DISCONTINUED | OUTPATIENT
Start: 2018-09-14 | End: 2018-09-14 | Stop reason: HOSPADM

## 2018-09-14 RX ORDER — NALOXONE HYDROCHLORIDE 0.4 MG/ML
.1-.4 INJECTION, SOLUTION INTRAMUSCULAR; INTRAVENOUS; SUBCUTANEOUS
Status: DISCONTINUED | OUTPATIENT
Start: 2018-09-14 | End: 2018-09-15 | Stop reason: HOSPADM

## 2018-09-14 RX ORDER — PRASUGREL 10 MG/1
10-60 TABLET, FILM COATED ORAL
Status: DISCONTINUED | OUTPATIENT
Start: 2018-09-14 | End: 2018-09-14 | Stop reason: HOSPADM

## 2018-09-14 RX ORDER — POTASSIUM CHLORIDE 7.45 MG/ML
10 INJECTION INTRAVENOUS
Status: DISCONTINUED | OUTPATIENT
Start: 2018-09-14 | End: 2018-09-14 | Stop reason: HOSPADM

## 2018-09-14 RX ORDER — EPTIFIBATIDE 2 MG/ML
180 INJECTION, SOLUTION INTRAVENOUS EVERY 10 MIN PRN
Status: DISCONTINUED | OUTPATIENT
Start: 2018-09-14 | End: 2018-09-14 | Stop reason: HOSPADM

## 2018-09-14 RX ORDER — DOBUTAMINE HYDROCHLORIDE 200 MG/100ML
2-20 INJECTION INTRAVENOUS CONTINUOUS PRN
Status: DISCONTINUED | OUTPATIENT
Start: 2018-09-14 | End: 2018-09-14 | Stop reason: HOSPADM

## 2018-09-14 RX ORDER — LISINOPRIL 5 MG/1
5 TABLET ORAL DAILY
Status: DISCONTINUED | OUTPATIENT
Start: 2018-09-15 | End: 2018-09-15 | Stop reason: HOSPADM

## 2018-09-14 RX ORDER — HYDRALAZINE HYDROCHLORIDE 20 MG/ML
10-20 INJECTION INTRAMUSCULAR; INTRAVENOUS
Status: DISCONTINUED | OUTPATIENT
Start: 2018-09-14 | End: 2018-09-14 | Stop reason: HOSPADM

## 2018-09-14 RX ORDER — EPTIFIBATIDE 2 MG/ML
2 INJECTION, SOLUTION INTRAVENOUS CONTINUOUS PRN
Status: DISCONTINUED | OUTPATIENT
Start: 2018-09-14 | End: 2018-09-14 | Stop reason: HOSPADM

## 2018-09-14 RX ORDER — NITROGLYCERIN 0.4 MG/1
0.4 TABLET SUBLINGUAL EVERY 5 MIN PRN
Status: DISCONTINUED | OUTPATIENT
Start: 2018-09-14 | End: 2018-09-14 | Stop reason: HOSPADM

## 2018-09-14 RX ORDER — CLOPIDOGREL BISULFATE 75 MG/1
75 TABLET ORAL
Status: DISCONTINUED | OUTPATIENT
Start: 2018-09-14 | End: 2018-09-14 | Stop reason: HOSPADM

## 2018-09-14 RX ORDER — METOPROLOL TARTRATE 1 MG/ML
5 INJECTION, SOLUTION INTRAVENOUS EVERY 5 MIN PRN
Status: DISCONTINUED | OUTPATIENT
Start: 2018-09-14 | End: 2018-09-14 | Stop reason: HOSPADM

## 2018-09-14 RX ORDER — NALOXONE HYDROCHLORIDE 0.4 MG/ML
0.4 INJECTION, SOLUTION INTRAMUSCULAR; INTRAVENOUS; SUBCUTANEOUS EVERY 5 MIN PRN
Status: DISCONTINUED | OUTPATIENT
Start: 2018-09-14 | End: 2018-09-14 | Stop reason: HOSPADM

## 2018-09-14 RX ORDER — FUROSEMIDE 10 MG/ML
40 INJECTION INTRAMUSCULAR; INTRAVENOUS ONCE
Status: COMPLETED | OUTPATIENT
Start: 2018-09-14 | End: 2018-09-14

## 2018-09-14 RX ORDER — PHENYLEPHRINE HCL IN 0.9% NACL 1 MG/10 ML
20-100 SYRINGE (ML) INTRAVENOUS
Status: DISCONTINUED | OUTPATIENT
Start: 2018-09-14 | End: 2018-09-14 | Stop reason: HOSPADM

## 2018-09-14 RX ORDER — PROTAMINE SULFATE 10 MG/ML
1-5 INJECTION, SOLUTION INTRAVENOUS
Status: COMPLETED | OUTPATIENT
Start: 2018-09-14 | End: 2018-09-14

## 2018-09-14 RX ORDER — FLUMAZENIL 0.1 MG/ML
0.2 INJECTION, SOLUTION INTRAVENOUS
Status: DISCONTINUED | OUTPATIENT
Start: 2018-09-14 | End: 2018-09-14 | Stop reason: HOSPADM

## 2018-09-14 RX ORDER — MAGNESIUM HYDROXIDE 1200 MG/15ML
1000 LIQUID ORAL CONTINUOUS PRN
Status: DISCONTINUED | OUTPATIENT
Start: 2018-09-14 | End: 2018-09-14 | Stop reason: HOSPADM

## 2018-09-14 RX ORDER — ADENOSINE 3 MG/ML
12-12000 INJECTION, SOLUTION INTRAVENOUS
Status: DISCONTINUED | OUTPATIENT
Start: 2018-09-14 | End: 2018-09-14 | Stop reason: HOSPADM

## 2018-09-14 RX ORDER — FUROSEMIDE 10 MG/ML
20-100 INJECTION INTRAMUSCULAR; INTRAVENOUS
Status: DISCONTINUED | OUTPATIENT
Start: 2018-09-14 | End: 2018-09-14 | Stop reason: HOSPADM

## 2018-09-14 RX ORDER — SODIUM CHLORIDE 9 MG/ML
INJECTION, SOLUTION INTRAVENOUS CONTINUOUS
Status: CANCELLED | OUTPATIENT
Start: 2018-09-14

## 2018-09-14 RX ORDER — METHYLPREDNISOLONE SODIUM SUCCINATE 125 MG/2ML
125 INJECTION, POWDER, LYOPHILIZED, FOR SOLUTION INTRAMUSCULAR; INTRAVENOUS
Status: DISCONTINUED | OUTPATIENT
Start: 2018-09-14 | End: 2018-09-14 | Stop reason: HOSPADM

## 2018-09-14 RX ORDER — ASPIRIN 81 MG/1
81 TABLET ORAL DAILY
Status: DISCONTINUED | OUTPATIENT
Start: 2018-09-15 | End: 2018-09-15 | Stop reason: HOSPADM

## 2018-09-14 RX ORDER — ASPIRIN 325 MG
325 TABLET ORAL
Status: DISCONTINUED | OUTPATIENT
Start: 2018-09-14 | End: 2018-09-14 | Stop reason: HOSPADM

## 2018-09-14 RX ORDER — NICARDIPINE HYDROCHLORIDE 2.5 MG/ML
100 INJECTION INTRAVENOUS
Status: DISCONTINUED | OUTPATIENT
Start: 2018-09-14 | End: 2018-09-14 | Stop reason: HOSPADM

## 2018-09-14 RX ORDER — ASPIRIN 81 MG/1
81-324 TABLET, CHEWABLE ORAL
Status: DISCONTINUED | OUTPATIENT
Start: 2018-09-14 | End: 2018-09-14 | Stop reason: HOSPADM

## 2018-09-14 RX ORDER — FENTANYL CITRATE 50 UG/ML
25-50 INJECTION, SOLUTION INTRAMUSCULAR; INTRAVENOUS
Status: DISCONTINUED | OUTPATIENT
Start: 2018-09-14 | End: 2018-09-14 | Stop reason: HOSPADM

## 2018-09-14 RX ORDER — PROTAMINE SULFATE 10 MG/ML
25-100 INJECTION, SOLUTION INTRAVENOUS EVERY 5 MIN PRN
Status: DISCONTINUED | OUTPATIENT
Start: 2018-09-14 | End: 2018-09-14 | Stop reason: HOSPADM

## 2018-09-14 RX ORDER — ARGATROBAN 1 MG/ML
150 INJECTION, SOLUTION INTRAVENOUS
Status: DISCONTINUED | OUTPATIENT
Start: 2018-09-14 | End: 2018-09-14 | Stop reason: HOSPADM

## 2018-09-14 RX ORDER — DOPAMINE HYDROCHLORIDE 160 MG/100ML
2-20 INJECTION, SOLUTION INTRAVENOUS CONTINUOUS PRN
Status: DISCONTINUED | OUTPATIENT
Start: 2018-09-14 | End: 2018-09-14 | Stop reason: HOSPADM

## 2018-09-14 RX ORDER — NALOXONE HYDROCHLORIDE 0.4 MG/ML
.2-.4 INJECTION, SOLUTION INTRAMUSCULAR; INTRAVENOUS; SUBCUTANEOUS
Status: ACTIVE | OUTPATIENT
Start: 2018-09-14 | End: 2018-09-15

## 2018-09-14 RX ORDER — FLUMAZENIL 0.1 MG/ML
0.2 INJECTION, SOLUTION INTRAVENOUS
Status: ACTIVE | OUTPATIENT
Start: 2018-09-14 | End: 2018-09-15

## 2018-09-14 RX ORDER — NIFEDIPINE 10 MG/1
10 CAPSULE ORAL
Status: DISCONTINUED | OUTPATIENT
Start: 2018-09-14 | End: 2018-09-14 | Stop reason: HOSPADM

## 2018-09-14 RX ORDER — ATROPINE SULFATE 0.1 MG/ML
0.5 INJECTION INTRAVENOUS EVERY 5 MIN PRN
Status: ACTIVE | OUTPATIENT
Start: 2018-09-14 | End: 2018-09-15

## 2018-09-14 RX ORDER — POTASSIUM CHLORIDE 29.8 MG/ML
20 INJECTION INTRAVENOUS
Status: DISCONTINUED | OUTPATIENT
Start: 2018-09-14 | End: 2018-09-14 | Stop reason: HOSPADM

## 2018-09-14 RX ORDER — IOPAMIDOL 755 MG/ML
40 INJECTION, SOLUTION INTRAVASCULAR ONCE
Status: COMPLETED | OUTPATIENT
Start: 2018-09-14 | End: 2018-09-14

## 2018-09-14 RX ORDER — ATROPINE SULFATE 0.1 MG/ML
.5-1 INJECTION INTRAVENOUS
Status: DISCONTINUED | OUTPATIENT
Start: 2018-09-14 | End: 2018-09-14 | Stop reason: HOSPADM

## 2018-09-14 RX ORDER — NITROGLYCERIN 5 MG/ML
100-200 VIAL (ML) INTRAVENOUS
Status: DISCONTINUED | OUTPATIENT
Start: 2018-09-14 | End: 2018-09-14 | Stop reason: HOSPADM

## 2018-09-14 RX ORDER — ARGATROBAN 1 MG/ML
350 INJECTION, SOLUTION INTRAVENOUS
Status: DISCONTINUED | OUTPATIENT
Start: 2018-09-14 | End: 2018-09-14 | Stop reason: HOSPADM

## 2018-09-14 RX ORDER — HEPARIN SODIUM 1000 [USP'U]/ML
1000-10000 INJECTION, SOLUTION INTRAVENOUS; SUBCUTANEOUS EVERY 5 MIN PRN
Status: DISCONTINUED | OUTPATIENT
Start: 2018-09-14 | End: 2018-09-14 | Stop reason: HOSPADM

## 2018-09-14 RX ORDER — DEXTROSE MONOHYDRATE 25 G/50ML
12.5-5 INJECTION, SOLUTION INTRAVENOUS EVERY 30 MIN PRN
Status: DISCONTINUED | OUTPATIENT
Start: 2018-09-14 | End: 2018-09-14 | Stop reason: HOSPADM

## 2018-09-14 RX ORDER — VERAPAMIL HYDROCHLORIDE 2.5 MG/ML
1-5 INJECTION, SOLUTION INTRAVENOUS
Status: DISCONTINUED | OUTPATIENT
Start: 2018-09-14 | End: 2018-09-14 | Stop reason: HOSPADM

## 2018-09-14 RX ORDER — LIDOCAINE 40 MG/G
CREAM TOPICAL
Status: DISCONTINUED | OUTPATIENT
Start: 2018-09-14 | End: 2018-09-15 | Stop reason: HOSPADM

## 2018-09-14 RX ORDER — NITROGLYCERIN 5 MG/ML
100-500 VIAL (ML) INTRAVENOUS
Status: DISCONTINUED | OUTPATIENT
Start: 2018-09-14 | End: 2018-09-14 | Stop reason: HOSPADM

## 2018-09-14 RX ORDER — SODIUM NITROPRUSSIDE 25 MG/ML
100-200 INJECTION INTRAVENOUS
Status: DISCONTINUED | OUTPATIENT
Start: 2018-09-14 | End: 2018-09-14 | Stop reason: HOSPADM

## 2018-09-14 RX ORDER — CLOPIDOGREL BISULFATE 75 MG/1
300-600 TABLET ORAL
Status: DISCONTINUED | OUTPATIENT
Start: 2018-09-14 | End: 2018-09-14 | Stop reason: HOSPADM

## 2018-09-14 RX ORDER — ONDANSETRON 2 MG/ML
4 INJECTION INTRAMUSCULAR; INTRAVENOUS EVERY 4 HOURS PRN
Status: DISCONTINUED | OUTPATIENT
Start: 2018-09-14 | End: 2018-09-14 | Stop reason: HOSPADM

## 2018-09-14 RX ORDER — LIDOCAINE HYDROCHLORIDE 10 MG/ML
30 INJECTION, SOLUTION EPIDURAL; INFILTRATION; INTRACAUDAL; PERINEURAL
Status: DISCONTINUED | OUTPATIENT
Start: 2018-09-14 | End: 2018-09-14 | Stop reason: HOSPADM

## 2018-09-14 RX ORDER — LIDOCAINE 40 MG/G
CREAM TOPICAL
Status: CANCELLED | OUTPATIENT
Start: 2018-09-14

## 2018-09-14 RX ORDER — ENALAPRILAT 1.25 MG/ML
1.25-2.5 INJECTION INTRAVENOUS
Status: DISCONTINUED | OUTPATIENT
Start: 2018-09-14 | End: 2018-09-14 | Stop reason: HOSPADM

## 2018-09-14 RX ADMIN — MULTIPLE VITAMINS W/ MINERALS TAB 1 TABLET: TAB at 08:30

## 2018-09-14 RX ADMIN — DOCUSATE SODIUM 100 MG: 100 CAPSULE, LIQUID FILLED ORAL at 20:10

## 2018-09-14 RX ADMIN — ASPIRIN 81 MG CHEWABLE TABLET 81 MG: 81 TABLET CHEWABLE at 08:29

## 2018-09-14 RX ADMIN — MIDAZOLAM 1 MG: 1 INJECTION INTRAMUSCULAR; INTRAVENOUS at 17:59

## 2018-09-14 RX ADMIN — LORAZEPAM 1 MG: 1 TABLET ORAL at 16:24

## 2018-09-14 RX ADMIN — ATORVASTATIN CALCIUM 80 MG: 80 TABLET, FILM COATED ORAL at 00:24

## 2018-09-14 RX ADMIN — Medication 1500 UNITS: at 17:42

## 2018-09-14 RX ADMIN — IOPAMIDOL 40 ML: 755 INJECTION, SOLUTION INTRAVASCULAR at 18:15

## 2018-09-14 RX ADMIN — PANTOPRAZOLE SODIUM 40 MG: 40 TABLET, DELAYED RELEASE ORAL at 16:24

## 2018-09-14 RX ADMIN — METOPROLOL SUCCINATE 25 MG: 25 TABLET, EXTENDED RELEASE ORAL at 08:30

## 2018-09-14 RX ADMIN — PROTAMINE SULFATE 5 MG: 10 INJECTION, SOLUTION INTRAVENOUS at 18:06

## 2018-09-14 RX ADMIN — ADENOSINE 140 MCG/KG/MIN: 3 INJECTION, SOLUTION INTRAVENOUS at 18:02

## 2018-09-14 RX ADMIN — FUROSEMIDE 40 MG: 10 INJECTION, SOLUTION INTRAVENOUS at 21:19

## 2018-09-14 RX ADMIN — HEPARIN SODIUM 5000 UNITS: 1000 INJECTION, SOLUTION INTRAVENOUS; SUBCUTANEOUS at 17:52

## 2018-09-14 RX ADMIN — DOCUSATE SODIUM 100 MG: 100 CAPSULE, LIQUID FILLED ORAL at 08:29

## 2018-09-14 RX ADMIN — LORAZEPAM 1 MG: 1 TABLET ORAL at 02:16

## 2018-09-14 RX ADMIN — ACETAMINOPHEN 650 MG: 325 TABLET, FILM COATED ORAL at 16:24

## 2018-09-14 RX ADMIN — HUMAN ALBUMIN MICROSPHERES AND PERFLUTREN 6 ML: 10; .22 INJECTION, SOLUTION INTRAVENOUS at 12:45

## 2018-09-14 RX ADMIN — ATORVASTATIN CALCIUM 80 MG: 80 TABLET, FILM COATED ORAL at 20:10

## 2018-09-14 RX ADMIN — MIDAZOLAM 1 MG: 1 INJECTION INTRAMUSCULAR; INTRAVENOUS at 17:40

## 2018-09-14 RX ADMIN — PANTOPRAZOLE SODIUM 40 MG: 40 TABLET, DELAYED RELEASE ORAL at 08:30

## 2018-09-14 RX ADMIN — HEPARIN SODIUM 1150 UNITS/HR: 10000 INJECTION, SOLUTION INTRAVENOUS at 14:41

## 2018-09-14 RX ADMIN — Medication 500 UNITS: at 17:42

## 2018-09-14 RX ADMIN — NITROGLYCERIN 0.6 MCG/KG/MIN: 20 INJECTION INTRAVENOUS at 14:14

## 2018-09-14 RX ADMIN — FOLIC ACID 1 MG: 1 TABLET ORAL at 08:29

## 2018-09-14 RX ADMIN — NICOTINE 1 PATCH: 21 PATCH TRANSDERMAL at 08:26

## 2018-09-14 RX ADMIN — FENTANYL CITRATE 50 MCG: 50 INJECTION, SOLUTION INTRAMUSCULAR; INTRAVENOUS at 17:40

## 2018-09-14 RX ADMIN — Medication 100 MG: at 08:29

## 2018-09-14 ASSESSMENT — ACTIVITIES OF DAILY LIVING (ADL)
ADLS_ACUITY_SCORE: 9

## 2018-09-14 ASSESSMENT — PAIN DESCRIPTION - DESCRIPTORS
DESCRIPTORS: SORE
DESCRIPTORS: ACHING;SORE

## 2018-09-14 NOTE — PROGRESS NOTES
Cardiology Overnight Fellow Brief Note    57M Hx NICM (40) and Etoh/meth/tobacco use and non-obst CAD (cath 2007) admitted with Unstable angina progressing to 10/10 chest pain at rest, significant improved with nitroglycerin.  EKG with non specific lateral TWI, no significant ST deviation  First biomarker negative    Heparin gtt, nitroglycerin gtt, asa, statin, bb  Consented for coronary angiogram but noted that pt has non-every day Meth use (last time last week) and medication adherence issues but states he is willing/capable of adherning with DAPT if he requires PCI  NPO p MN for probable angio in AM    Will be staffed in AM    Dean Haynes MD  Cardiology Fellow  [phone]

## 2018-09-14 NOTE — PROGRESS NOTES
"CLINICAL NUTRITION SERVICES - ASSESSMENT NOTE     Nutrition Prescription    RECOMMENDATIONS FOR MDs/PROVIDERS TO ORDER:  None at this time.    Malnutrition Status:    Severe malnutrition in the context of acute illness.     Recommendations already ordered by Registered Dietitian (RD):  None at this time - pt NPO    Future/Additional Recommendations:  If pt with poor PO intake (eating <75% of meals consistently) and/or weight decreases, order oral nutrition supplements and consider calorie counts to quantify PO intake.      REASON FOR ASSESSMENT  Abel Orellana is a/an 57 year old male assessed by the dietitian for Admission Nutrition Risk Screen for unintentional loss of 10# or more in the past two months    NUTRITION HISTORY  PMH acute on chronic heart failure, SOB, CAD, HTN, HLD, past substance abuse with heroin, chronic alcoholism, and duodenal ulcers admitted d/t worsening SOB and chest pain.     Per pt, appetite has been poor lately. Eating ~1 meal per day for past 2 weeks and the food pt is eating is high in fat and sodium (burgers). Pt does not have any chewing or swallowing issues and no abnormal stools PTA. Pt takes vitamin B12 and fish oil supplements at home (unsure why on B12 as labs not checked per chart review). Now he is NPO and hungry.     CURRENT NUTRITION ORDERS  Diet: NPO    LABS  Labs reviewed    MEDICATIONS  Multivitamin with mineral daily (Thera-Vit-M) + 100 mg thiamine for improved cardiac function given pt presents with NICM LVEF 30% (NYHA IIIa, stage 3)  Folic acid  Diuretics     ANTHROPOMETRICS  Height: 167.6 cm (5' 6\")  Most Recent Weight: 68.8 kg (151 lb 9.6 oz)    IBW: 64.5 kg  BMI: Normal BMI  Weight History: Weight history over past year is minimal in chart review (including Care Everywhere). Per pt report, his typical weight is ~165-167 lbs and he was admitted at 151 lbs. This would be a 14 lbs (8%) wt loss in ~2 weeks per pt report.  Wt Readings from Last 10 Encounters:   09/13/18 " 68.8 kg (151 lb 9.6 oz)   1/9/2018 73.9 kg (163 lbs) per Care Everywhere    Dosing Weight: 69 kg (actual) - based on lowest documented wt so far this adm (68.8 kg on 9/13) and IBW of 64.5 kg.     ASSESSED NUTRITION NEEDS  Estimated Energy Needs: 1119-6114 kcals/day (25 - 30 kcals/kg)  Justification: Maintenance  Estimated Protein Needs: 69-83 grams protein/day (1 - 1.2 grams of pro/kg)  Justification: Hypercatabolism with acute illness  Estimated Fluid Needs: (1 mL/kcal)   Justification: Maintenance and Per provider pending fluid status    PHYSICAL FINDINGS  See malnutrition section below.    MALNUTRITION  % Intake: </= 50% for >/= 5 days (severe)  % Weight Loss: > 2% in 1 week (severe)  Subcutaneous Fat Loss: None observed  Muscle Loss: None observed  Fluid Accumulation/Edema: None noted  Malnutrition Diagnosis: Severe malnutrition in the context of acute illness.     NUTRITION DIAGNOSIS  Inadequate oral intake related to decreased appetite as evidenced by pt report of eating <50% of usual intake (only eating ~1 meal/day for 2 weeks) and pt with reported 14 lbs (8%) wt loss in 2 weeks.       INTERVENTIONS  Implementation  Nutrition Education: Discussed role of RD in nutrition POC. Discussed tips for a low sodium diet, low sodium foods and drinks handout, and how to read nutrition labels handout. Encouraged pt to choose foods low in sodium and remove salt shaker from table when eating. Also encouraged pt to choose foods high in protein given recent weight loss.     Goals  Diet adv v nutrition support within 2-3 days.  Patient to consume % of nutritionally adequate meal trays TID, or the equivalent with supplements/snacks.     Monitoring/Evaluation  Progress toward goals will be monitored and evaluated per protocol.    Cheyenne Fontanez, MS, RD, LD  6C Pgr:  [phone]

## 2018-09-14 NOTE — PROCEDURES
PRELIMINARY CARDIAC CATH REPORT:     PROCEDURES PERFORMED:   Coronary Angiography  Physiologic Assessment (FFR)    PHYSICIANS:  Attending Physician: Ramana Foley MD  Interventional Cardiology Fellow: None  Cardiology Fellow: Cha Macdonald MD    INDICATION:  Abel Orellana is a 57 year old male with risk factor profile (+) HTN, (-) DM, (+) hypercholesterolemia, (+) tobacco use, (-) fam Hx premature CAD, who presents with chest pain, elevated troponin, and reduced EF and presents for an urgent coronary angiogram. The clinical presentation was Suspected CAD and NSTEMI.     DESCRIPTION:  1. Consent obtained with discussion of risks.  All questions were answered.  2. Sterile prep and procedure.  3. Location with Sheaths:   Rt Femoral Arterial  4 Fr 25 cm [long]  4. Access: Local anesthetic with lidocaine.  A standard 18 guage needle with ultrasound guidance was used to establish vascular access using a modified Seldinger technique.  5. Diagnostic Catheters:   4 Fr  [place] and 3DRC  6. Guiding Catheters:  None  6. Estimated blood loss: < 25 ml    MEDICATIONS:  The procedure was performed under conscious sedation for 30 minutes from 1740 to 1810.  The patient was assessed immediately before the first sedation medication was administered.  Midazolam 2 mg and Fentanyl 50 mcg were administered.  Heart rate, BP, respiration, oxygen saturation and patient responses were monitored throughout the procedure with the assistance of the RN under my supervision.  >> IV UFH was administered to achieve anticoagulation.  >> Antiplatelet Therapy: ASA 81 mg     Procedures:    CORONARY ANGIOGRAM:   1. Both coronary arteries arise from their respective cusps.  2. Dominance: Right  3. The distal LM has 20% stenosis.  4. LAD: Type 3 [LAD supplies the entire apex].. The LAD gives rise to septal perforators, a medium caliber bifurcating D1. Mid-LAD has a focal area of 10% stenosis and there is a 20% stenosis in the proximal D1.   5. LCX gives  rise to large caliber OM1 and large caliber OM2 vessels. The LCX distal to the takeoff of the OM2 is small. No angiographic evidence of disease.    6. Large caliber RCA gives rise to PL branches and supplies PDA. The pRCA has a 30% stenosis.      FUNCTIONAL ASSESSMENT:  Adequate anticoagulation was was obtained with IV UFH. A [place] Verrata wire was passed across the lesion in the left main and mid-LAD vessels.  Hyperemia was induced with IV Adenosine (140 mcg/kg/min).  The FFR at peak hyperemia was 0.95 across the left main and 0.93 across the mid-LAD.      Sheath Removal:  The right femoral sheath was manually removed in the cardiac catheterization laboratory.    Contrast: Isovue, 40 ml     Fluoroscopy Time: 2.7 min    COMPLICATIONS:  1. None    SUMMARY:   Mild focal coronary artery disease. No hemodynamically significant lesions by FFR (FFR of left main 0.95, FFR of mLAD 0.93). Nonischemic etiology of cardiomyopathy and troponin elevation.    PLAN:   >> Bedrest per protocol.  >> Continued medical management and lifestyle modification for cardiovascular risk factor optimization.   >> Return to the primary inpatient team for further evaluation and management.    The attending interventional cardiologist was present and supervised all critical aspects the procedure.    Findings discussed with Cardiology team.    See CVIS report for final draft.    Cha Macdonald MD   Cardiology Fellow    Ramana Foley MD   Cardiology Staff

## 2018-09-15 VITALS
DIASTOLIC BLOOD PRESSURE: 72 MMHG | WEIGHT: 148.1 LBS | BODY MASS INDEX: 23.8 KG/M2 | TEMPERATURE: 98 F | SYSTOLIC BLOOD PRESSURE: 100 MMHG | HEART RATE: 83 BPM | RESPIRATION RATE: 16 BRPM | HEIGHT: 66 IN | OXYGEN SATURATION: 94 %

## 2018-09-15 PROBLEM — R00.1 BRADYCARDIA: Status: ACTIVE | Noted: 2018-09-15

## 2018-09-15 LAB
ANION GAP SERPL CALCULATED.3IONS-SCNC: 10 MMOL/L (ref 3–14)
BUN SERPL-MCNC: 15 MG/DL (ref 7–30)
CALCIUM SERPL-MCNC: 9.2 MG/DL (ref 8.5–10.1)
CHLORIDE SERPL-SCNC: 101 MMOL/L (ref 94–109)
CO2 SERPL-SCNC: 25 MMOL/L (ref 20–32)
CREAT SERPL-MCNC: 0.93 MG/DL (ref 0.66–1.25)
ERYTHROCYTE [DISTWIDTH] IN BLOOD BY AUTOMATED COUNT: 13.9 % (ref 10–15)
GFR SERPL CREATININE-BSD FRML MDRD: 83 ML/MIN/1.7M2
GLUCOSE SERPL-MCNC: 169 MG/DL (ref 70–99)
HCT VFR BLD AUTO: 44.8 % (ref 40–53)
HGB BLD-MCNC: 14.8 G/DL (ref 13.3–17.7)
LMWH PPP CHRO-ACNC: <0.1 IU/ML
MAGNESIUM SERPL-MCNC: 2.2 MG/DL (ref 1.6–2.3)
MCH RBC QN AUTO: 29.8 PG (ref 26.5–33)
MCHC RBC AUTO-ENTMCNC: 33 G/DL (ref 31.5–36.5)
MCV RBC AUTO: 90 FL (ref 78–100)
PLATELET # BLD AUTO: 383 10E9/L (ref 150–450)
POTASSIUM SERPL-SCNC: 4.1 MMOL/L (ref 3.4–5.3)
RBC # BLD AUTO: 4.96 10E12/L (ref 4.4–5.9)
SODIUM SERPL-SCNC: 136 MMOL/L (ref 133–144)
WBC # BLD AUTO: 7.9 10E9/L (ref 4–11)

## 2018-09-15 PROCEDURE — 25000132 ZZH RX MED GY IP 250 OP 250 PS 637: Performed by: STUDENT IN AN ORGANIZED HEALTH CARE EDUCATION/TRAINING PROGRAM

## 2018-09-15 PROCEDURE — 36415 COLL VENOUS BLD VENIPUNCTURE: CPT | Performed by: STUDENT IN AN ORGANIZED HEALTH CARE EDUCATION/TRAINING PROGRAM

## 2018-09-15 PROCEDURE — 85027 COMPLETE CBC AUTOMATED: CPT | Performed by: STUDENT IN AN ORGANIZED HEALTH CARE EDUCATION/TRAINING PROGRAM

## 2018-09-15 PROCEDURE — 80048 BASIC METABOLIC PNL TOTAL CA: CPT | Performed by: STUDENT IN AN ORGANIZED HEALTH CARE EDUCATION/TRAINING PROGRAM

## 2018-09-15 PROCEDURE — 83735 ASSAY OF MAGNESIUM: CPT | Performed by: STUDENT IN AN ORGANIZED HEALTH CARE EDUCATION/TRAINING PROGRAM

## 2018-09-15 PROCEDURE — 25000132 ZZH RX MED GY IP 250 OP 250 PS 637: Performed by: INTERNAL MEDICINE

## 2018-09-15 PROCEDURE — 85520 HEPARIN ASSAY: CPT | Performed by: STUDENT IN AN ORGANIZED HEALTH CARE EDUCATION/TRAINING PROGRAM

## 2018-09-15 RX ORDER — CLOPIDOGREL BISULFATE 75 MG/1
225 TABLET ORAL ONCE
Status: COMPLETED | OUTPATIENT
Start: 2018-09-15 | End: 2018-09-15

## 2018-09-15 RX ORDER — LISINOPRIL 5 MG/1
5 TABLET ORAL DAILY
Qty: 30 TABLET | Refills: 3 | Status: SHIPPED | OUTPATIENT
Start: 2018-09-16

## 2018-09-15 RX ORDER — NICOTINE 21 MG/24HR
1 PATCH, TRANSDERMAL 24 HOURS TRANSDERMAL DAILY
Qty: 30 PATCH | Refills: 3 | Status: SHIPPED | OUTPATIENT
Start: 2018-09-16

## 2018-09-15 RX ORDER — ATORVASTATIN CALCIUM 80 MG/1
80 TABLET, FILM COATED ORAL EVERY EVENING
Qty: 30 TABLET | Refills: 3 | Status: SHIPPED | OUTPATIENT
Start: 2018-09-15

## 2018-09-15 RX ORDER — NITROGLYCERIN 0.4 MG/1
TABLET SUBLINGUAL
Qty: 25 TABLET | Refills: 3 | Status: SHIPPED | OUTPATIENT
Start: 2018-09-15

## 2018-09-15 RX ORDER — PANTOPRAZOLE SODIUM 40 MG/1
40 TABLET, DELAYED RELEASE ORAL
Qty: 30 TABLET | Refills: 3 | Status: SHIPPED | OUTPATIENT
Start: 2018-09-15

## 2018-09-15 RX ORDER — CLOPIDOGREL BISULFATE 75 MG/1
75 TABLET ORAL DAILY
Qty: 30 TABLET | Refills: 3 | Status: SHIPPED | OUTPATIENT
Start: 2018-09-16

## 2018-09-15 RX ORDER — FUROSEMIDE 20 MG
20 TABLET ORAL DAILY
Qty: 30 TABLET | Refills: 3 | Status: SHIPPED | OUTPATIENT
Start: 2018-09-15

## 2018-09-15 RX ORDER — LANOLIN ALCOHOL/MO/W.PET/CERES
100 CREAM (GRAM) TOPICAL DAILY
Qty: 30 TABLET | Refills: 3 | Status: SHIPPED | OUTPATIENT
Start: 2018-09-16

## 2018-09-15 RX ORDER — CLOPIDOGREL BISULFATE 75 MG/1
75 TABLET ORAL DAILY
Status: DISCONTINUED | OUTPATIENT
Start: 2018-09-15 | End: 2018-09-15 | Stop reason: HOSPADM

## 2018-09-15 RX ORDER — METOPROLOL SUCCINATE 25 MG/1
25 TABLET, EXTENDED RELEASE ORAL DAILY
Qty: 30 TABLET | Refills: 3 | Status: SHIPPED | OUTPATIENT
Start: 2018-09-16

## 2018-09-15 RX ORDER — FOLIC ACID 1 MG/1
1 TABLET ORAL DAILY
Qty: 30 TABLET | Refills: 3 | Status: SHIPPED | OUTPATIENT
Start: 2018-09-16

## 2018-09-15 RX ORDER — MULTIPLE VITAMINS W/ MINERALS TAB 9MG-400MCG
1 TAB ORAL DAILY
Qty: 30 EACH | Refills: 3 | Status: SHIPPED | OUTPATIENT
Start: 2018-09-16

## 2018-09-15 RX ADMIN — CLOPIDOGREL 225 MG: 75 TABLET, FILM COATED ORAL at 12:20

## 2018-09-15 RX ADMIN — FOLIC ACID 1 MG: 1 TABLET ORAL at 08:05

## 2018-09-15 RX ADMIN — ASPIRIN 81 MG: 81 TABLET, COATED ORAL at 08:04

## 2018-09-15 RX ADMIN — METOPROLOL SUCCINATE 25 MG: 25 TABLET, EXTENDED RELEASE ORAL at 08:04

## 2018-09-15 RX ADMIN — CLOPIDOGREL 75 MG: 75 TABLET, FILM COATED ORAL at 10:21

## 2018-09-15 RX ADMIN — DOCUSATE SODIUM 100 MG: 100 CAPSULE, LIQUID FILLED ORAL at 08:05

## 2018-09-15 RX ADMIN — LISINOPRIL 5 MG: 5 TABLET ORAL at 08:04

## 2018-09-15 RX ADMIN — MULTIPLE VITAMINS W/ MINERALS TAB 1 TABLET: TAB at 08:03

## 2018-09-15 RX ADMIN — PANTOPRAZOLE SODIUM 40 MG: 40 TABLET, DELAYED RELEASE ORAL at 08:04

## 2018-09-15 RX ADMIN — Medication 100 MG: at 08:05

## 2018-09-15 RX ADMIN — ACETAMINOPHEN 650 MG: 325 TABLET, FILM COATED ORAL at 04:17

## 2018-09-15 RX ADMIN — NICOTINE 1 PATCH: 21 PATCH TRANSDERMAL at 07:58

## 2018-09-15 ASSESSMENT — ACTIVITIES OF DAILY LIVING (ADL)
ADLS_ACUITY_SCORE: 9

## 2018-09-15 ASSESSMENT — PAIN DESCRIPTION - DESCRIPTORS: DESCRIPTORS: SORE

## 2018-09-15 NOTE — DISCHARGE SUMMARY
Discharge Summary     Abel Orellana MRN# [id number]   YOB: 1960 Age: 57 year old      Primary Care Physician:   Luis Salazar  Admitting Attending Physician:            Jerome Daily MD  Discharge Attending Physician:             Jerome Daily MD   Discharge Resident:   Jean-Claude Florence, PGY3  Discharging Service:   [facility] 1      Date of Admission: 9/13/2018  Date of Discharge:        09/15/18      Discharge Diagnoses:  NSTEMI  Acute on chronic heart failure  NICM LVEF 26% (NYHA II, Stage B)  Non obstructive CAD  HTN  HLD  Chronic smoker  Pulmonary nodule  Chronic HCV  Duodenal ulcer  Past meth use    FOLLOW UP PLAN    1. The patient needs to follow up with his PCP in 1 weeks:  - Following lab tests are recommended: BMP + Mg  - Following imaging studies are needed: possible PET/CT  - Following labs are pending at discharge: HCV titer, comprehensive drug screen    2. The patient needs to follow up with Cardiology, Pulmonology, Gastroenterology, 1 - 2 weeks:  - Following lab tests are recommended: BMP + Mg  - Following imaging studies are needed: possible PET/CT  - Following labs are pending at discharge: HCV titer, comprehensive drug screen    Labs/Procedures/imaging with results:    CMP  Recent Labs  Lab 09/15/18  0739 09/14/18  0650 09/13/18  1755    134 139   POTASSIUM 4.1 3.9 3.9   BUN 15 15 12   CR 0.93 0.86 0.83   ALBUMIN  --   --  3.6   BILITOTAL  --   --  0.6   ALKPHOS  --   --  88   AST  --   --  23   ALT  --   --  40     CBC  Recent Labs  Lab 09/15/18  0739 09/14/18  0650 09/13/18  1755   WBC 7.9 10.9 8.6   HGB 14.8 13.5 13.7    387 381     INR  Recent Labs  Lab 09/13/18  1755   INR 1.07     Lab Results   Component Value Date    TROPI 0.527 (HH) 09/14/2018    TROPI <0.015 09/13/2018     Significant Results and Procedures   Results for orders placed or performed during the hospital encounter of 09/13/18   XR Chest Port 1 View    Narrative     Exam: XR CHEST PORT 1 VW, 9/13/2018 6:26 PM    Indication: shortness of breath, CHF exacerbation, evaluate for  pulmonary edema or signs of lung congestion;     Comparison: None available    Findings:   Single portable view of the chest at 75 degrees. The trachea is  midline. The cardiomediastinal silhouette is enlarged. Diffuse mixed  interstitial and airspace opacities throughout both lungs. Pulmonary  vascular congestion. Small right pleural effusion. No pneumothorax.  The visualized upper abdomen is unremarkable. No acute osseous  abnormality.      Impression    Impression: Cardia mediastinal silhouette is enlarged and there are  diffuse interstitial and airspace opacities, compatible with pulmonary  edema. Small right pleural effusion. Infectious etiologies are not  excluded.    I have personally reviewed the examination and initial interpretation  and I agree with the findings.    MEETA ASHER MD   US Abdomen Limited w Abd/Pelv Duplex Complete Port    Narrative    EXAMINATION: US ABDOMEN COMPLETE WITH DOPPLER, 9/13/2018 7:56 PM     COMPARISON: None.    HISTORY: Patient with chronic alcohol use and hepatitis C virus,  evaluate for cirrhosis, also portal and hepatic vein flows TECHNIQUE:  The abdomen was scanned in standard fashion with specialized  ultrasound transducer(s) using both gray-scale, color Doppler, and  spectral flow techniques.    Findings:    Liver: The liver demonstrates normal homogeneous echotexture. No  evidence of a focal hepatic mass.     Extrahepatic portal vein flow is antegrade, measuring 37 cm/sec.  Right portal vein flow is antegrade, measuring 19 cm/sec.  Left portal vein flow is antegrade, measuring 8 cm/sec.    Flow in the hepatic artery is towards the liver and:  89 cm/sec peak systolic  0.59 resistive index.     The splenic vein is patent and flow is towards the liver.  The left,  middle, and right hepatic veins are patent with flow towards the IVC.  The IVC is patent with flow  towards the heart.   The visualized aorta  is not dilated.    Gallbladder: There is a focal area of wall thickening, with associated  ringdown artifact, consistent with adenomyomatosis. Remainder of the  gallbladder wall is within normal limits. There is no wall thickening,  pericholecystic fluid, positive sonographic Jiménez's sign or evidence  for cholelithiasis    Bile Ducts: Both the intra- and extrahepatic biliary system are of  normal caliber.  The common bile duct measures 5 mm in diameter.    Pancreas: Visualized portions of the head and body of the pancreas are  unremarkable.     Kidneys: The right kidney demonstrates normal echotexture, measuring  11.3 cm in craniocaudal dimension. There is a simple anechoic cyst  within the midpole, measuring up to 1.6 cm.    Fluid: No evidence of ascites or pleural effusions.      Impression    Impression:   1.  Normal appearance of the liver; no sonographic evidence of  cirrhosis. No focal liver lesion.  2.  Patent and normal Doppler evaluation.  3.  Adenomyomatosis.  4.  Simple right renal cyst.    I have personally reviewed the examination and initial interpretation  and I agree with the findings.    MEETA ASHER MD       Consultations obtained:   Consultations This Hospital Stay   VASCULAR ACCESS CARE ADULT IP CONSULT  CORE CLINIC EVALUATION IP CONSULT  PHARMACY IP CONSULT  PHARMACY IP CONSULT  GI HEPATOLOGY ADULT IP CONSULT  SMOKING CESSATION PROGRAM IP CONSULT  SMOKING CESSATION PROGRAM IP CONSULT    Brief HPI:  Adapted from H&P on 9/13/2018.  Please refer to it for further details.    58 y/o male with PMHx significant for non-obstructive CAD, NICM LVEF 30% in 2014 (NYHA IIIa Stage C), HTN, HLD, past substance abuse with meth and heroin, chronic alcoholism, chronic HCV and duodenal ulcers was admitted due to worsening shortness of breath on exertion and chest pain.     Hospital Course:   #CAD  #NSTEMI  Angiogram in 2008 showed 10-20% occlusion of LAD and 20-25%  mid RCA. Risk factors for CAD include HTN, HLD, smoking, and family history (mother  at 53 form heart disease). Troponin peaked at 0.527. TTE without wall motion abnormalities. Patient had heparin drip and nitroglycerin drip. Coronary angio on admission showed 10% occlusion of mid LAD, 20% of D1 and 30% of RCA. No lesions were stented. He was loaded with plavix and aspirin. Patient discharging on plavix, aspirin, atorvastatin, metoprolol and lisinopril.      #Acute on chronic heart failure  #Non-ischemic cardiomyopathy (LVEF 26%)  Has known non-ischemic cardiomyopathy. Echo today shows LVEF 26% (was 30% in 2014) with severely reduced global LV function. He has been non compliant with GDMT in the past. CXR shows cardiomegaly and pulmonary edema, and he has elevated JVP on physical exam. Current symptoms could be from worsening cardiomyopathy 2/2 alcohol and substance abuse. Drinks 3-5 mixed drinks per week, smokes 1 ppd, uses marijuana and meth. He also has risk factors for CAD and presents with anginal pain. Thus there may be ischemic causes. He was discharged on goal directed therapy and lasix 20mg daily. Referral to core clinic was placed and cardiology. Patient will need to continue optimization of HF management as well as evaluation of ICD.      #HTN  Continued lisinopril.      #HLD  Discharged on statin.      #Pulmonary nodule  #Precarinal node  CT scan from OSH revealed 1.3cm spiculated nodule within the lingula. Previous CT in 2018 showed nodule at the lingula measuring 0.6cm.  Precarinal node detected in 2018. Unclear if these represent inflammation, but due to doubling in size of lingular node in the setting of active chronic smoking patient will need PET CT. Nodule clinic referral placed.      #Chronic active HCV  #Fatty liver disease  #Chronic alcohol use  Patient has HCV with elevated titers. He also has chronic alcohol use and liver changes on biopsy from OSH that demonstrated fatty  liver disease back in 2001. LFTs normal on admission as well as INR. Patient has high risk for cirrhosis and HCC. Abdominal US showed no cirrhosis or liver lesions. GI referral placed for HCV treatment.      #Duodenal ulcer  EGD from Jan 2018 showed multiple bleeding ulcers in duodenum. No H. Pylori. To continue home PPI regimen.     Discharge disposition and destination:  Home    Discharge Physical Exam:  Temp:  [97.6  F (36.4  C)-98.5  F (36.9  C)] 98  F (36.7  C)  Pulse:  [83] 83  Heart Rate:  [81-98] 98  Resp:  [16-18] 16  BP: ()/(55-89) 100/72  SpO2:  [93 %-97 %] 94 %  Vitals:    09/13/18 1622 09/15/18 0800   Weight: 68.8 kg (151 lb 9.6 oz) 67.2 kg (148 lb 1.6 oz)       General: AAOX3, NAD  HEENT: NCAT, PERRLA, EOMI, anicteric sclera, no oral ulcers  CV: RRR, 2/6 systolic murmur in right 2nd intercostal space, no JVD on exam, no rubs or gallops  Lungs: CTAB  Abd: soft, NT, ND, + bowel sounds, no HSM or masses palpable  Ext: WWP, no BLE edema  Skin: no rashes, cyanosis, or jaundice  Neuro: no focal neurological deficit    Discharge Medications:  Current Discharge Medication List      START taking these medications    Details   aspirin 81 MG EC tablet Take 1 tablet (81 mg) by mouth daily  Qty: 30 tablet, Refills: 3    Associated Diagnoses: Acute on chronic heart failure, unspecified heart failure type (H); NSTEMI (non-ST elevated myocardial infarction) (H); Coronary artery disease involving native coronary artery of native heart with other form of angina pectoris (H)      atorvastatin (LIPITOR) 80 MG tablet Take 1 tablet (80 mg) by mouth every evening  Qty: 30 tablet, Refills: 3    Associated Diagnoses: NSTEMI (non-ST elevated myocardial infarction) (H); Coronary artery disease involving native coronary artery of native heart with other form of angina pectoris (H)      clopidogrel (PLAVIX) 75 MG tablet Take 1 tablet (75 mg) by mouth daily  Qty: 30 tablet, Refills: 3    Associated Diagnoses: NSTEMI (non-ST  elevated myocardial infarction) (H)      folic acid (FOLVITE) 1 MG tablet Take 1 tablet (1 mg) by mouth daily  Qty: 30 tablet, Refills: 3    Associated Diagnoses: Chronic alcoholism (H)      furosemide (LASIX) 20 MG tablet Take 1 tablet (20 mg) by mouth daily  Qty: 30 tablet, Refills: 3    Associated Diagnoses: Acute on chronic heart failure, unspecified heart failure type (H)      lisinopril (PRINIVIL/ZESTRIL) 5 MG tablet Take 1 tablet (5 mg) by mouth daily  Qty: 30 tablet, Refills: 3    Associated Diagnoses: NSTEMI (non-ST elevated myocardial infarction) (H); Acute on chronic heart failure, unspecified heart failure type (H); Benign essential hypertension      metoprolol succinate (TOPROL-XL) 25 MG 24 hr tablet Take 1 tablet (25 mg) by mouth daily  Qty: 30 tablet, Refills: 3    Associated Diagnoses: Acute on chronic heart failure, unspecified heart failure type (H); NSTEMI (non-ST elevated myocardial infarction) (H); Coronary artery disease involving native coronary artery of native heart with other form of angina pectoris (H)      multivitamin, therapeutic with minerals (THERA-VIT-M) TABS tablet Take 1 tablet by mouth daily  Qty: 30 each, Refills: 3    Associated Diagnoses: Chronic alcoholism (H)      nicotine (NICODERM CQ) 21 MG/24HR 24 hr patch Place 1 patch onto the skin daily  Qty: 30 patch, Refills: 3    Associated Diagnoses: Cigarette nicotine dependence with other nicotine-induced disorder      nitroGLYcerin (NITROSTAT) 0.4 MG sublingual tablet For chest pain place 1 tablet under the tongue every 5 minutes for 3 doses. If symptoms persist 5 minutes after 1st dose call 911.  Qty: 25 tablet, Refills: 3    Associated Diagnoses: Unstable angina (H)      pantoprazole (PROTONIX) 40 MG EC tablet Take 1 tablet (40 mg) by mouth 2 times daily (before meals)  Qty: 30 tablet, Refills: 3    Associated Diagnoses: Duodenal ulcer without hemorrhage or perforation and without obstruction      thiamine 100 MG tablet Take 1  tablet (100 mg) by mouth daily  Qty: 30 tablet, Refills: 3    Associated Diagnoses: Chronic alcoholism (H)             Discharge instructions:    Discharge Procedure Orders  Cardiology Eval Adult Referral     CARDIOVASCULAR CORE CLINIC REFERRAL     Cardiac Rehab Referral     Lung Nodule Program Referral Location: [facility] - [phone]     Reason for your hospital stay   Order Comments: You were admitted due to worsening symptoms of heart failure.     Adult [facility] Follow-up and recommended labs and tests   Order Comments: Follow up with primary care provider, within 7 days to evaluate medication change, to evaluate treatment change and for hospital follow- up.  The following labs/tests are recommended: BMP +Mg.      Appointments on [place] and/or [facility] (with [facility] or [facility] provider or service). Call [phone] or  [phone]  if you haven't heard regarding these appointments within 7 days of discharge.     Activity   Order Comments: Your activity upon discharge: activity as tolerated   Order Specific Question Answer Comments   Is discharge order? Yes      Discharge Instructions   Order Comments: STOP use of cigarettes, alcohol and ilegal drugs in order to protect your heart.     Full Code     GI Hepatology Adult IP Consult   Order Comments: Pt with liver disease in the setting of alcoholism and active HCV.     Diet   Order Comments: Follow this diet upon discharge: Orders Placed This Encounter     Low Saturated Fat Na <2400 mg   Order Specific Question Answer Comments   Is discharge order? Yes           Patient seen and discussed with attending physician Dr. Daily the day of discharge.    Jean-Claude Florence MD PhD  Internal Medicine PGY-3  P: [phone]

## 2018-09-16 ENCOUNTER — PATIENT OUTREACH (OUTPATIENT)
Dept: CARE COORDINATION | Facility: CLINIC | Age: 58
End: 2018-09-16

## 2018-09-17 ENCOUNTER — DOCUMENTATION ONLY (OUTPATIENT)
Dept: PHARMACY | Facility: CLINIC | Age: 58
End: 2018-09-17

## 2018-09-17 DIAGNOSIS — I50.22 CHRONIC SYSTOLIC HEART FAILURE (H): Primary | ICD-10-CM

## 2018-09-17 LAB
COMPREHEN DRUG ANALYSIS UR: NORMAL
HCV AB SERPL QL IA: REACTIVE
HCV RNA SERPL NAA+PROBE-ACNC: ABNORMAL [IU]/ML
HCV RNA SERPL NAA+PROBE-LOG IU: 5.9 LOG IU/ML
KCT BLD-ACNC: 103 SEC (ref 75–150)

## 2018-09-17 NOTE — PROGRESS NOTES
[facility]: Post-Discharge Note  SITUATION                                                      Admission:    Admission Date: 09/13/18   Reason for Admission: NSTEMI  Discharge:   Discharge Date: 09/15/18  Discharge Diagnosis: NSTEMI  Discharge Service: Cardiology    BACKGROUND                                                      Mr. Abel Orellana is a 58yo man w/ medical history significant for non-obstructive CAD, NICM LVEF 30% in 2014 (NYHA IIIa Stage C), HTN, HLD, past substance abuse with meth and heroin, chronic alcoholism, chronic HCV and duodenal ulcers, admitted 9/13/2018 for worsening SOB and unstable angina, now with NSTEMI.    ASSESSMENT      Discharge Assessment  Patient reports symptoms are: Improved  Does the patient have all of their medications?: Yes  Does patient know what their new medications are for?: Yes  Does paient have a follow-up appointment scheduled?: Yes  Does patient have any other questions or concerns?: No    Post-op  Did the patient have surgery or a procedure: No    PLAN                                                      Outpatient Plan:      1. The patient needs to follow up with his PCP in 1 weeks:  - Following lab tests are recommended: BMP + Mg  - Following imaging studies are needed: possible PET/CT  - Following labs are pending at discharge: HCV titer, comprehensive drug screen     2. The patient needs to follow up with Cardiology, Pulmonology, Gastroenterology, 1 - 2 weeks:  - Following lab tests are recommended: BMP + Mg  - Following imaging studies are needed: possible PET/CT  - Following labs are pending at discharge: HCV titer, comprehensive drug screen    Future Appointments  Date Time Provider Department Center   9/21/2018 8:30 AM  LAB [facility]   9/21/2018 9:00 AM Beth Andrade APRN CNP [facility]           Inna Singh, CMA

## 2018-09-17 NOTE — PROGRESS NOTES
Prior Authorization Approval    pantoprazole 40 mg tabs  Date Initiated: 09/17/2018  Date Completed: 09/17/2018  Prior Auth Type: Quantity Limit     Status: Approved    Effective Date: 09/15/2018 - 09/15/2019  Copay: 0.00  [place] Filled: Yes    Insurance: [facility]  Ph: [phone]  ID: [id number]  Case Number: [id number]  Submitted Via: Website: [email or web address]      Val Kennedy  [facility] Pharmacy Liaison  Ph: [phone] Page: [phone]

## 2018-09-19 ENCOUNTER — CARE COORDINATION (OUTPATIENT)
Dept: CARDIOLOGY | Facility: CLINIC | Age: 58
End: 2018-09-19

## 2018-09-19 NOTE — PROGRESS NOTES
DANIELLE fr patient to follow up on how he is doing after hospital discharge. Patient [place] [facility] [place]

## 2018-09-25 ENCOUNTER — TELEPHONE (OUTPATIENT)
Dept: CARDIOLOGY | Facility: CLINIC | Age: 58
End: 2018-09-25

## 2018-09-25 ENCOUNTER — CARE COORDINATION (OUTPATIENT)
Dept: CARDIOLOGY | Facility: CLINIC | Age: 58
End: 2018-09-25

## 2018-09-25 DIAGNOSIS — I21.4 NSTEMI (NON-ST ELEVATED MYOCARDIAL INFARCTION) (H): Primary | ICD-10-CM

## 2018-09-26 NOTE — PROGRESS NOTES
Spoke with patient and relayed results and recommendations. Patient states understanding and agrees to call with any questions or concerns.Letter mailed per patient request.

## 2018-09-27 DIAGNOSIS — I21.4 NSTEMI (NON-ST ELEVATED MYOCARDIAL INFARCTION) (H): Primary | ICD-10-CM

## 2018-12-17 NOTE — PLAN OF CARE
"[facility]G ADMISSION NOTE    Patient admitted to room 6413-2 at approximately 1600 via cart from emergency room ([facility]). Patient was accompanied by other: EMS/Paramedic.     Verbal SBAR report received from prior to patient arrival.     Patient ambulated to bed independently. Patient alert and oriented X 3. The patient is not having any pain.  . Admission vital signs: Blood pressure 112/84, temperature 97.5  F (36.4  C), temperature source Oral, resp. rate 16, height 1.676 m (5' 6\"), weight 68.8 kg (151 lb 9.6 oz), SpO2 99 %. Patient was oriented to plan of care, call light, bed controls, tv, telephone, bathroom and visiting hours.     Risk Assessment    The following safety risks were identified during admission: none. Yellow risk band applied: NO.     Skin Initial Assessment    This writer admitted this patient and completed a full skin assessment and Raffy score in the Adult PCS flowsheet. Appropriate interventions initiated as needed.     Secondary skin check completed by José Luis Talbert.    Skin  Inspection of bony prominences: Full  Skin WDL: WDL    Raffy Risk Assessment  Sensory Perception: 4-->no impairment  Moisture: 4-->rarely moist  Activity: 3-->walks occasionally  Mobility: 4-->no limitation  Nutrition: 4-->excellent  Friction and Shear: 2-->potential problem  Raffy Score: 21  Bed Support Surface: Atmos Air mattress  Raffy Intervention(s) Implemented: draw sheets, fecal incontinence , fabric between skin folds    José Luis Talbert    "
Problem: Goal Outcome Summary  Goal: Goal Outcome Summary  RN  1. Pt will be hemodynamically stable.  2. Pt and family will verbalize understanding of plan of care.  3. Pt will remain free of falls  4. Pain will be under control or minimal    Outcome: Improving    D: Admitted 08/13 for SOB with exertion and chest pain. Pt has h/o NICM LVEF 30% in 2014 (CHF Stage III), CAD, HTN, HLD, past substance with meth and heroin, EtOH abuse, chronic HCV and duodenal ulcers.  I: Monitored vitals and assessed pt status  Pt on nitroglycerin (for chest pain) and heparin gtts  A: A0x4. VSS. Room air. SR. Afebrile. Patient sleeping between cares.   P: NPO for possible angiogram today. ECHO. US done yesterday evening. Will continue to monitor and manage pt per plan of care. Please report any pertinent change (s) in pt's condition.           
Problem: Patient Care Overview  Goal: Plan of Care/Patient Progress Review  Outcome: Adequate for Discharge Date Met: 09/15/18  DISCHARGE   Discharged to: Home  Via: Automobile  Accompanied by: friend  Discharge Instructions: diet, activity, medications, follow up appointments, when to call the MD, and what to watchout for (i.e. s/s of infection, increasing SOB, palpitations, chest pain,)  Prescriptions: To be filled by discharge pharmacy per pt's request; medication list reviewed & sent with pt  Follow Up Appointments: arranged; information given  Belongings: All sent with pt. He received his belongings from [employer]  IV: out  Telemetry: off  Pt exhibits understanding of above discharge instructions; all questions answered.  Discharge Paperwork: faxed    Patient's vital signs have been stable and right groin site was stable. Rhythm was SB/SR/ST  with 0-3 PVC's/min and a rare PAC. He left unit 6C at 15:15 and will be picked up by his friend in front of the hospital      
Problem: Patient Care Overview  Goal: Plan of Care/Patient Progress Review  Outcome: Improving  D:  Pt with non-obstructive CAD, NICM LVEF 30% in 2014, HTN, HLD, past substance abuse with meth and heroin, chronic alcoholism, chronic HCV and duodenal ulcers, admitted 9/13/2018 for worsening SOB and unstable angina, with NSTEMI per MD note.      I: Monitored vitals and assessed pt status.     A: A0x4. VSS, on RA. SR. Afebrile. No nausea,no palpitation, no SOB observed. Denies chest pain. UO 1500 ml after lasix injection.  Coronary angiogram on 9/14 with mild focal CAD, R groin site covered with bandage no hematoma,no bleeding observed.        Temp:  [97.6  F (36.4  C)-98.5  F (36.9  C)] 97.7  F (36.5  C)  Pulse:  [83-88] 83  Heart Rate:  [78-92] 88  Resp:  [16-20] 16  BP: ()/(55-89) 99/75  SpO2:  [91 %-99 %] 96 %      P: Continue to monitor Pt status and report changes to treatment team.        
Problem: Patient Care Overview  Goal: Plan of Care/Patient Progress Review  Outcome: No Change  D: Patient was admitted on 9/13/18 for chest pain. He has been NPO all day for an angiogram today. He has  H/o  NICM, ETOH/Meth/tobacco/Heroin use, unstable Angina, non-obstructive CAD, Hep C, and HTN    I: Monitored vitals and assessed patient status. He had an echocardiogram today in his room. His nitroglycerin was at 1 mcg/kg/cc this morning but his blood pressures have been low therefor it has been decreased. He will be getting 40 mg iv Lasix at 18:00 per order    Changed: Lisinopril was discontinued today but will be restarted tomorrow AM.  Running: Heparin @ 1150 U/hr and his hep 10a level will be rechecked at 21:00. Nitroglycerine @ 0.5 mcg/kg/cc    A: Patient's vital signs have been stable 87-94/66-72 MAP (70's). Rhythm was SR 70-80 with 0-3 PVC's/min and an occasional PAC. He complained of chest discomfort but it is tolerable. He stated that his last alcohol drink was on Tuesday and stated he has never had alcohol withdrawal in the past. He is alert and oriented. At 12:50 his HR went down to 40-50 for 9 seconds. It appeared to be a wandering atrial pacemaker    I/O this shift:  In: 60 [P.O.:60]  Out: 400 [Urine:400]    Temp:  [97  F (36.1  C)-98.2  F (36.8  C)] 98.2  F (36.8  C)  Pulse:  [88] 88  Heart Rate:  [78-97] 79  Resp:  [16-20] 18  BP: ()/(64-94) 88/66  SpO2:  [91 %-99 %] 93 %      P: Continue to monitor patient status and report changes to treatment team.            
Performing Laboratory: [phone]
Billing Type: Third-Party Bill
Bill For Surgical Tray: no
Expected Date Of Service: 12/17/2018

## 2020-09-16 ENCOUNTER — COMMUNICATION - HEALTHEAST (OUTPATIENT)
Dept: SCHEDULING | Facility: CLINIC | Age: 60
End: 2020-09-16

## 2020-09-17 ENCOUNTER — AMBULATORY - HEALTHEAST (OUTPATIENT)
Dept: ONCOLOGY | Facility: CLINIC | Age: 60
End: 2020-09-17

## 2020-09-17 DIAGNOSIS — C34.2 MALIGNANT NEOPLASM OF MIDDLE LOBE OF RIGHT LUNG (H): ICD-10-CM

## 2020-09-18 ENCOUNTER — SURGERY - HEALTHEAST (OUTPATIENT)
Dept: CARDIOLOGY | Facility: CLINIC | Age: 60
End: 2020-09-18

## 2020-09-18 ASSESSMENT — MIFFLIN-ST. JEOR
SCORE: 1449.92
SCORE: 1431.77

## 2020-10-07 ENCOUNTER — COMMUNICATION - HEALTHEAST (OUTPATIENT)
Dept: ADMINISTRATIVE | Facility: HOSPITAL | Age: 60
End: 2020-10-07

## 2021-05-30 ENCOUNTER — RECORDS - HEALTHEAST (OUTPATIENT)
Dept: ADMINISTRATIVE | Facility: CLINIC | Age: 61
End: 2021-05-30

## 2021-06-05 VITALS — WEIGHT: 156.1 LBS | BODY MASS INDEX: 25.09 KG/M2 | HEIGHT: 66 IN

## 2021-06-20 NOTE — LETTER
Letter by Whitney Storey MD at      Author: Whitney Storey MD Service: -- Author Type: --    Filed:  Encounter Date: 10/7/2020 Status: (Other)         Abel Orellana  [address] [place] [address]               October 7, 2020      Dear Abel:    We are sorry that you missed your appointment for a PET on 9/29/20 and Dr Storey on 9/30/20. Your health and follow-up medical care are important to us. Please call our office as soon as possible so that we may reschedule your appointment. If you have already rescheduled your appointment, please disregard this letter. [phone]    Sincerely,        Whitney Storey MD

## (undated) RX ORDER — LIDOCAINE HYDROCHLORIDE 10 MG/ML
INJECTION, SOLUTION EPIDURAL; INFILTRATION; INTRACAUDAL; PERINEURAL
Status: DISPENSED
Start: 2018-09-14

## (undated) RX ORDER — HEPARIN SODIUM 1000 [USP'U]/ML
INJECTION, SOLUTION INTRAVENOUS; SUBCUTANEOUS
Status: DISPENSED
Start: 2018-09-14

## (undated) RX ORDER — ADENOSINE 3 MG/ML
INJECTION, SOLUTION INTRAVENOUS
Status: DISPENSED
Start: 2018-09-14

## (undated) RX ORDER — FENTANYL CITRATE 50 UG/ML
INJECTION, SOLUTION INTRAMUSCULAR; INTRAVENOUS
Status: DISPENSED
Start: 2018-09-14

## (undated) RX ORDER — NITROGLYCERIN 5 MG/ML
VIAL (ML) INTRAVENOUS
Status: DISPENSED
Start: 2018-09-14

## (undated) RX ORDER — PROTAMINE SULFATE 10 MG/ML
INJECTION, SOLUTION INTRAVENOUS
Status: DISPENSED
Start: 2018-09-14